# Patient Record
Sex: MALE | Race: BLACK OR AFRICAN AMERICAN | NOT HISPANIC OR LATINO | Employment: FULL TIME | URBAN - METROPOLITAN AREA
[De-identification: names, ages, dates, MRNs, and addresses within clinical notes are randomized per-mention and may not be internally consistent; named-entity substitution may affect disease eponyms.]

---

## 2018-08-14 DIAGNOSIS — F32.9 MAJOR DEPRESSIVE DISORDER: ICD-10-CM

## 2018-08-14 RX ORDER — FLUOXETINE HYDROCHLORIDE 20 MG/1
CAPSULE ORAL
Qty: 90 CAPSULE | Refills: 1 | OUTPATIENT
Start: 2018-08-14

## 2022-11-21 ENCOUNTER — PATIENT MESSAGE (OUTPATIENT)
Dept: OTOLARYNGOLOGY | Facility: CLINIC | Age: 42
End: 2022-11-21
Payer: COMMERCIAL

## 2022-12-01 ENCOUNTER — OFFICE VISIT (OUTPATIENT)
Dept: INTERNAL MEDICINE | Facility: CLINIC | Age: 42
End: 2022-12-01
Payer: COMMERCIAL

## 2022-12-01 ENCOUNTER — TELEPHONE (OUTPATIENT)
Dept: OTOLARYNGOLOGY | Facility: CLINIC | Age: 42
End: 2022-12-01
Payer: COMMERCIAL

## 2022-12-01 VITALS
WEIGHT: 315 LBS | HEART RATE: 84 BPM | DIASTOLIC BLOOD PRESSURE: 82 MMHG | BODY MASS INDEX: 38.36 KG/M2 | SYSTOLIC BLOOD PRESSURE: 138 MMHG | OXYGEN SATURATION: 96 % | HEIGHT: 76 IN

## 2022-12-01 DIAGNOSIS — Z79.899 ON PRE-EXPOSURE PROPHYLAXIS FOR HIV: ICD-10-CM

## 2022-12-01 DIAGNOSIS — Z23 NEED FOR VACCINATION: ICD-10-CM

## 2022-12-01 DIAGNOSIS — Z11.59 ENCOUNTER FOR HEPATITIS C SCREENING TEST FOR LOW RISK PATIENT: ICD-10-CM

## 2022-12-01 DIAGNOSIS — R73.03 PREDIABETES: ICD-10-CM

## 2022-12-01 DIAGNOSIS — G47.33 OSA ON CPAP: ICD-10-CM

## 2022-12-01 DIAGNOSIS — Z00.00 HEALTH MAINTENANCE EXAMINATION: Primary | ICD-10-CM

## 2022-12-01 DIAGNOSIS — J35.1 LARGE TONSILS: ICD-10-CM

## 2022-12-01 DIAGNOSIS — I10 ESSENTIAL HYPERTENSION: ICD-10-CM

## 2022-12-01 DIAGNOSIS — Z20.2 EXPOSURE TO SEXUALLY TRANSMITTED DISEASE (STD): ICD-10-CM

## 2022-12-01 DIAGNOSIS — E55.9 VITAMIN D DEFICIENCY: ICD-10-CM

## 2022-12-01 DIAGNOSIS — Z13.6 SCREENING FOR CARDIOVASCULAR CONDITION: ICD-10-CM

## 2022-12-01 DIAGNOSIS — Z11.4 SCREENING FOR HIV WITHOUT PRESENCE OF RISK FACTORS: ICD-10-CM

## 2022-12-01 PROBLEM — F32.9 MAJOR DEPRESSION: Status: ACTIVE | Noted: 2018-09-11

## 2022-12-01 PROBLEM — E66.01 MORBID OBESITY WITH BMI OF 40.0-44.9, ADULT: Status: ACTIVE | Noted: 2020-08-06

## 2022-12-01 PROCEDURE — 99386 PREV VISIT NEW AGE 40-64: CPT | Mod: 25,S$GLB,, | Performed by: STUDENT IN AN ORGANIZED HEALTH CARE EDUCATION/TRAINING PROGRAM

## 2022-12-01 PROCEDURE — 90472 IMMUNIZATION ADMIN EACH ADD: CPT | Mod: S$GLB,,, | Performed by: STUDENT IN AN ORGANIZED HEALTH CARE EDUCATION/TRAINING PROGRAM

## 2022-12-01 PROCEDURE — 99999 PR PBB SHADOW E&M-EST. PATIENT-LVL IV: ICD-10-PCS | Mod: PBBFAC,,, | Performed by: STUDENT IN AN ORGANIZED HEALTH CARE EDUCATION/TRAINING PROGRAM

## 2022-12-01 PROCEDURE — 99203 PR OFFICE/OUTPT VISIT, NEW, LEVL III, 30-44 MIN: ICD-10-PCS | Mod: 25,S$GLB,, | Performed by: STUDENT IN AN ORGANIZED HEALTH CARE EDUCATION/TRAINING PROGRAM

## 2022-12-01 PROCEDURE — 99203 OFFICE O/P NEW LOW 30 MIN: CPT | Mod: 25,S$GLB,, | Performed by: STUDENT IN AN ORGANIZED HEALTH CARE EDUCATION/TRAINING PROGRAM

## 2022-12-01 PROCEDURE — 90471 FLU VACCINE (QUAD) GREATER THAN OR EQUAL TO 3YO PRESERVATIVE FREE IM: ICD-10-PCS | Mod: S$GLB,,, | Performed by: STUDENT IN AN ORGANIZED HEALTH CARE EDUCATION/TRAINING PROGRAM

## 2022-12-01 PROCEDURE — 90715 TDAP VACCINE GREATER THAN OR EQUAL TO 7YO IM: ICD-10-PCS | Mod: S$GLB,,, | Performed by: STUDENT IN AN ORGANIZED HEALTH CARE EDUCATION/TRAINING PROGRAM

## 2022-12-01 PROCEDURE — 90471 IMMUNIZATION ADMIN: CPT | Mod: S$GLB,,, | Performed by: STUDENT IN AN ORGANIZED HEALTH CARE EDUCATION/TRAINING PROGRAM

## 2022-12-01 PROCEDURE — 99386 PR PREVENTIVE VISIT,NEW,40-64: ICD-10-PCS | Mod: 25,S$GLB,, | Performed by: STUDENT IN AN ORGANIZED HEALTH CARE EDUCATION/TRAINING PROGRAM

## 2022-12-01 PROCEDURE — 90686 FLU VACCINE (QUAD) GREATER THAN OR EQUAL TO 3YO PRESERVATIVE FREE IM: ICD-10-PCS | Mod: S$GLB,,, | Performed by: STUDENT IN AN ORGANIZED HEALTH CARE EDUCATION/TRAINING PROGRAM

## 2022-12-01 PROCEDURE — 99999 PR PBB SHADOW E&M-EST. PATIENT-LVL IV: CPT | Mod: PBBFAC,,, | Performed by: STUDENT IN AN ORGANIZED HEALTH CARE EDUCATION/TRAINING PROGRAM

## 2022-12-01 PROCEDURE — 90715 TDAP VACCINE 7 YRS/> IM: CPT | Mod: S$GLB,,, | Performed by: STUDENT IN AN ORGANIZED HEALTH CARE EDUCATION/TRAINING PROGRAM

## 2022-12-01 PROCEDURE — 90686 IIV4 VACC NO PRSV 0.5 ML IM: CPT | Mod: S$GLB,,, | Performed by: STUDENT IN AN ORGANIZED HEALTH CARE EDUCATION/TRAINING PROGRAM

## 2022-12-01 PROCEDURE — 90472 TDAP VACCINE GREATER THAN OR EQUAL TO 7YO IM: ICD-10-PCS | Mod: S$GLB,,, | Performed by: STUDENT IN AN ORGANIZED HEALTH CARE EDUCATION/TRAINING PROGRAM

## 2022-12-01 RX ORDER — CITALOPRAM 20 MG/1
1 TABLET, FILM COATED ORAL DAILY
COMMUNITY
End: 2023-08-08 | Stop reason: SDUPTHER

## 2022-12-01 RX ORDER — EMTRICITABINE AND TENOFOVIR ALAFENAMIDE 200; 25 MG/1; MG/1
1 TABLET ORAL DAILY
COMMUNITY
End: 2023-08-08

## 2022-12-01 NOTE — PROGRESS NOTES
Subjective:       Patient ID: Baudilio Potts is a 42 y.o. male.    Chief Complaint: Health maintenance examination [Z00.00]    Patient is new to me, here to establish care.    Health maintenance -   Denies family history of colorectal cancer.   Significant family history of cardiac disease.   Denies family history of prostate cancer.  UTD on COVID19 primary/booster vaccinations.  Due for COVID19 bivalent, HPV, Tdap, influenza vaccinations.  Never smoker.  Drinks alcohol 1 time monthly, 1-3 drinks per sitting.  Denies drug use.  Currently sexually active with .  Agreeable to routine STI testing.  Due for HIV and Hep C screening.  Due for lipid screening.  Lab Results       Component                Value               Date                       LDLCALC                  140.8               10/25/2013            Works in [employer]  Not routinely exercising.  Trying to make healthful dietary changes.     Prediabetes -   Due for diabetes screening.  Lab Results       Component                Value               Date                       HGBA1C                   5.3                 10/25/2013              HTN -   Currently prescribed amlodipine.  Patient endorses taking medication as directed.  Denies side effects or concerns while taking medication.  Never previously evaluated for secondary causes of HTN.  Denies headaches, vision changes, CP, palpitations, or other concerning symptoms.  Due for micro albumin creatinine ratio.   BP Readings from Last 3 Encounters:  09/21/15 : (!) 134/98  03/03/15 : (!) 190/120  01/03/14 : (!) 160/90    LALITO -   Using CPAP nightly with good effect  Has been told large tonsils, interested in ENT evaluation    PrEP -  Taking Descovy daily as directed  Was previously taking Truvada, discontinued due to renal concerns   Denies missed doses since last appointment  Completed Hepatitis B vaccination  Due for Hepatitis A vaccination  Due for HPV vaccination  Completed MP vaccination  Renal  "function due to be checked with Cr   STI testing due to be performed, last done OCT2022   Denies current signs or symptoms of STI infection  Denies acute signs or symptoms of HIV infection since last visit      Review of Systems   Constitutional:  Negative for appetite change, chills, fatigue, fever and unexpected weight change.   Respiratory:  Negative for cough and shortness of breath.    Cardiovascular:  Negative for chest pain, palpitations and leg swelling.   Gastrointestinal:  Negative for abdominal pain, constipation, diarrhea, nausea and vomiting.   Genitourinary:  Negative for difficulty urinating and frequency.   Skin:  Negative for rash.   Neurological:  Negative for dizziness, syncope, weakness and headaches.       Current Outpatient Medications   Medication Instructions    amlodipine (NORVASC) 10 MG tablet TAKE 1 TABLET BY MOUTH EVERY DAY    citalopram (CELEXA) 20 MG tablet 1 tablet, Oral, Daily    emtricitabine-tenofovir alafen (DESCOVY) 200-25 mg Tab 1 tablet, Oral, Daily     Objective:      Vitals:    12/01/22 1419   BP: 138/82   Pulse: 84   SpO2: 96%   Weight: (!) 165 kg (363 lb 12.1 oz)   Height: 6' 4" (1.93 m)   PainSc: 0-No pain     Body mass index is 44.28 kg/m².    Physical Exam  Vitals reviewed.   Constitutional:       General: He is not in acute distress.     Appearance: Normal appearance. He is not ill-appearing or diaphoretic.   HENT:      Head: Normocephalic and atraumatic.      Right Ear: Tympanic membrane, ear canal and external ear normal. There is no impacted cerumen.      Left Ear: Tympanic membrane, ear canal and external ear normal. There is no impacted cerumen.      Nose: Nose normal. No rhinorrhea.      Mouth/Throat:      Mouth: Mucous membranes are moist.      Pharynx: Oropharynx is clear. No oropharyngeal exudate or posterior oropharyngeal erythema.      Tonsils: No tonsillar exudate. 3+ on the right. 3+ on the left.   Eyes:      General: No scleral icterus.        Right eye: No " discharge.         Left eye: No discharge.      Conjunctiva/sclera: Conjunctivae normal.   Neck:      Thyroid: No thyromegaly or thyroid tenderness.      Trachea: Trachea normal.   Cardiovascular:      Rate and Rhythm: Normal rate and regular rhythm.      Heart sounds: Normal heart sounds. No murmur heard.    No friction rub. No gallop.   Pulmonary:      Effort: Pulmonary effort is normal. No respiratory distress.      Breath sounds: Normal breath sounds. No stridor. No wheezing, rhonchi or rales.   Abdominal:      General: Bowel sounds are normal. There is no distension.      Palpations: Abdomen is soft.      Tenderness: There is no abdominal tenderness. There is no guarding or rebound.   Musculoskeletal:         General: No swelling or deformity.      Cervical back: Neck supple.   Lymphadenopathy:      Head:      Right side of head: No submandibular or posterior auricular adenopathy.      Left side of head: No submandibular or posterior auricular adenopathy.      Cervical: No cervical adenopathy.      Right cervical: No superficial, deep or posterior cervical adenopathy.     Left cervical: No superficial, deep or posterior cervical adenopathy.      Upper Body:      Right upper body: No supraclavicular adenopathy.      Left upper body: No supraclavicular adenopathy.   Skin:     General: Skin is warm and dry.   Neurological:      General: No focal deficit present.      Mental Status: He is alert. Mental status is at baseline.      Gait: Gait normal.   Psychiatric:         Mood and Affect: Mood normal.         Behavior: Behavior normal.       Assessment:       1. Health maintenance examination    2. Essential hypertension    3. LALITO on CPAP    4. On pre-exposure prophylaxis for HIV    5. Prediabetes    6. Vitamin D deficiency    7. Large tonsils    8. Need for vaccination    9. Screening for cardiovascular condition    10. Encounter for hepatitis C screening test for low risk patient    11. Screening for HIV without  presence of risk factors    12. Exposure to sexually transmitted disease (STD)        Plan:       Essential hypertension  Continue current medications  RTC in 3 months for follow up  -     CBC Auto Differential; Future  -     Comprehensive Metabolic Panel; Future  -     TSH; Future  -     Microalbumin/Creatinine Ratio, Urine; Future    LALITO on CPAP  Large tonsils  -     Ambulatory referral/consult to Sleep Disorders; Future  -     Ambulatory referral/consult to ENT; Future    On pre-exposure prophylaxis for HIV  Continue current medications  RTC in 3 months for follow up  -     HIV 1/2 Ag/Ab (4th Gen); Future  -     Hepatitis C Antibody; Future  -     RPR; Future  -     Hepatitis B Surface Antigen; Future  -     C. trachomatis/N. gonorrhoeae by AMP DNA [facility]; Urine; Future  -     C.trach/N.gonor AMP RNA; Future  -     C.trach/N.gonor AMP RNA; Future    Prediabetes  -     Hemoglobin A1C; Future    Vitamin D deficiency  -     Vitamin D; Future    Health maintenance examination  Need for vaccination  -     (In Office Administered) Tdap Vaccine  -     Influenza - Quadrivalent (PF)    Screening for cardiovascular condition  -     Lipid Panel; Future    Encounter for hepatitis C screening test for low risk patient  -     Hepatitis C Antibody; Future    Screening for HIV without presence of risk factors  -     HIV 1/2 Ag/Ab (4th Gen); Future    Exposure to sexually transmitted disease (STD)  -     RPR; Future  -     Hepatitis B Surface Antigen; Future  -     C. trachomatis/N. gonorrhoeae by AMP DNA [facility]; Urine; Future  -     C.trach/N.gonor AMP RNA; Future  -     C.trach/N.gonor AMP RNA; Future      Tammy Rivera MD  12/1/2022

## 2022-12-01 NOTE — PROGRESS NOTES
After obtaining consent, and per orders of Dr. Rivera , injection of Fluarix Lot #[id number] Expiration 06/30/2023  given by Margarita Brewster., RN  Patient instructed to remain in clinic for 20 minutes afterwards, and to report any adverse reaction to me immediately.    After obtaining consent, and per orders of Dr. Rivera , injection of TdaP Lot # [id number] Expiration 11/29/2024  given by Margarita Brewster, KAYLA . Patient instructed to remain in clinic for 20 minutes afterwards, and to report any adverse reaction to me immediately.

## 2022-12-02 ENCOUNTER — PATIENT MESSAGE (OUTPATIENT)
Dept: INTERNAL MEDICINE | Facility: CLINIC | Age: 42
End: 2022-12-02
Payer: COMMERCIAL

## 2022-12-05 ENCOUNTER — LAB VISIT (OUTPATIENT)
Dept: LAB | Facility: HOSPITAL | Age: 42
End: 2022-12-05
Attending: STUDENT IN AN ORGANIZED HEALTH CARE EDUCATION/TRAINING PROGRAM
Payer: COMMERCIAL

## 2022-12-05 ENCOUNTER — TELEPHONE (OUTPATIENT)
Dept: INTERNAL MEDICINE | Facility: CLINIC | Age: 42
End: 2022-12-05
Payer: COMMERCIAL

## 2022-12-05 DIAGNOSIS — Z79.899 ON PRE-EXPOSURE PROPHYLAXIS FOR HIV: ICD-10-CM

## 2022-12-05 DIAGNOSIS — I10 ESSENTIAL HYPERTENSION: ICD-10-CM

## 2022-12-05 DIAGNOSIS — Z00.00 HEALTH MAINTENANCE EXAMINATION: ICD-10-CM

## 2022-12-05 DIAGNOSIS — Z20.2 EXPOSURE TO SEXUALLY TRANSMITTED DISEASE (STD): ICD-10-CM

## 2022-12-05 LAB
ALBUMIN/CREAT UR: 18.9 UG/MG (ref 0–30)
C TRACH DNA SPEC QL NAA+PROBE: NOT DETECTED
CREAT UR-MCNC: 95 MG/DL (ref 23–375)
MICROALBUMIN UR DL<=1MG/L-MCNC: 18 UG/ML
N GONORRHOEA DNA SPEC QL NAA+PROBE: NOT DETECTED

## 2022-12-05 PROCEDURE — 82570 ASSAY OF URINE CREATININE: CPT | Performed by: STUDENT IN AN ORGANIZED HEALTH CARE EDUCATION/TRAINING PROGRAM

## 2022-12-05 PROCEDURE — 87591 N.GONORRHOEAE DNA AMP PROB: CPT | Performed by: STUDENT IN AN ORGANIZED HEALTH CARE EDUCATION/TRAINING PROGRAM

## 2022-12-05 PROCEDURE — 87491 CHLMYD TRACH DNA AMP PROBE: CPT | Performed by: STUDENT IN AN ORGANIZED HEALTH CARE EDUCATION/TRAINING PROGRAM

## 2022-12-05 PROCEDURE — 82043 UR ALBUMIN QUANTITATIVE: CPT | Performed by: STUDENT IN AN ORGANIZED HEALTH CARE EDUCATION/TRAINING PROGRAM

## 2022-12-05 NOTE — TELEPHONE ENCOUNTER
----- Message from Jessica Barth sent at 12/3/2022  4:24 PM CST -----  Regarding: ENT referral  Contact: [facility]  In basket message sent to [facility] medical staff    Please be advised that the patient's insurance is no longer active.  [facility] cannot process any referral request without active insurance on file.. Portal message sent to the patient to contact [facility] with her/his current insurance information.

## 2022-12-06 ENCOUNTER — PATIENT MESSAGE (OUTPATIENT)
Dept: INTERNAL MEDICINE | Facility: CLINIC | Age: 42
End: 2022-12-06
Payer: COMMERCIAL

## 2022-12-06 ENCOUNTER — PATIENT MESSAGE (OUTPATIENT)
Dept: RESEARCH | Facility: CLINIC | Age: 42
End: 2022-12-06
Payer: COMMERCIAL

## 2022-12-06 ENCOUNTER — TELEPHONE (OUTPATIENT)
Dept: INTERNAL MEDICINE | Facility: CLINIC | Age: 42
End: 2022-12-06
Payer: COMMERCIAL

## 2022-12-06 DIAGNOSIS — D64.9 ANEMIA, UNSPECIFIED TYPE: ICD-10-CM

## 2022-12-06 DIAGNOSIS — A53.9 SYPHILIS: Primary | ICD-10-CM

## 2022-12-06 NOTE — TELEPHONE ENCOUNTER
Please assist with scheduling patient for same day or ASAP nurse appointment for PCN injection. Order for in clinic bicillin injection placed. Please also assist with scheduling follow up labs, thank you!

## 2022-12-07 ENCOUNTER — CLINICAL SUPPORT (OUTPATIENT)
Dept: INTERNAL MEDICINE | Facility: CLINIC | Age: 42
End: 2022-12-07
Payer: COMMERCIAL

## 2022-12-07 ENCOUNTER — LAB VISIT (OUTPATIENT)
Dept: LAB | Facility: OTHER | Age: 42
End: 2022-12-07
Attending: STUDENT IN AN ORGANIZED HEALTH CARE EDUCATION/TRAINING PROGRAM
Payer: COMMERCIAL

## 2022-12-07 DIAGNOSIS — D64.9 ANEMIA, UNSPECIFIED TYPE: ICD-10-CM

## 2022-12-07 LAB
BASOPHILS # BLD AUTO: 0.03 K/UL (ref 0–0.2)
BASOPHILS NFR BLD: 0.7 % (ref 0–1.9)
DIFFERENTIAL METHOD: ABNORMAL
EOSINOPHIL # BLD AUTO: 0.1 K/UL (ref 0–0.5)
EOSINOPHIL NFR BLD: 2.6 % (ref 0–8)
ERYTHROCYTE [DISTWIDTH] IN BLOOD BY AUTOMATED COUNT: 13.2 % (ref 11.5–14.5)
FERRITIN SERPL-MCNC: 215 NG/ML (ref 20–300)
FOLATE SERPL-MCNC: 7.7 NG/ML (ref 4–24)
HCT VFR BLD AUTO: 41.2 % (ref 40–54)
HGB BLD-MCNC: 13.9 G/DL (ref 14–18)
IMM GRANULOCYTES # BLD AUTO: 0.01 K/UL (ref 0–0.04)
IMM GRANULOCYTES NFR BLD AUTO: 0.2 % (ref 0–0.5)
IRON SERPL-MCNC: 103 UG/DL (ref 45–160)
LYMPHOCYTES # BLD AUTO: 1.6 K/UL (ref 1–4.8)
LYMPHOCYTES NFR BLD: 37 % (ref 18–48)
MCH RBC QN AUTO: 31.7 PG (ref 27–31)
MCHC RBC AUTO-ENTMCNC: 33.7 G/DL (ref 32–36)
MCV RBC AUTO: 94 FL (ref 82–98)
MONOCYTES # BLD AUTO: 0.4 K/UL (ref 0.3–1)
MONOCYTES NFR BLD: 9.4 % (ref 4–15)
NEUTROPHILS # BLD AUTO: 2.1 K/UL (ref 1.8–7.7)
NEUTROPHILS NFR BLD: 50.1 % (ref 38–73)
NRBC BLD-RTO: 0 /100 WBC
PATH REV BLD -IMP: NORMAL
PATH REV BLD -IMP: NORMAL
PLATELET # BLD AUTO: 225 K/UL (ref 150–450)
PMV BLD AUTO: 9.8 FL (ref 9.2–12.9)
RBC # BLD AUTO: 4.38 M/UL (ref 4.6–6.2)
RETICS/RBC NFR AUTO: 1.8 % (ref 0.4–2)
SATURATED IRON: 29 % (ref 20–50)
TOTAL IRON BINDING CAPACITY: 361 UG/DL (ref 250–450)
TRANSFERRIN SERPL-MCNC: 244 MG/DL (ref 200–375)
VIT B12 SERPL-MCNC: 474 PG/ML (ref 210–950)
WBC # BLD AUTO: 4.27 K/UL (ref 3.9–12.7)

## 2022-12-07 PROCEDURE — 84466 ASSAY OF TRANSFERRIN: CPT | Performed by: STUDENT IN AN ORGANIZED HEALTH CARE EDUCATION/TRAINING PROGRAM

## 2022-12-07 PROCEDURE — 96372 THER/PROPH/DIAG INJ SC/IM: CPT | Mod: S$GLB,,, | Performed by: STUDENT IN AN ORGANIZED HEALTH CARE EDUCATION/TRAINING PROGRAM

## 2022-12-07 PROCEDURE — 96372 PR INJECTION,THERAP/PROPH/DIAG2ST, IM OR SUBCUT: ICD-10-PCS | Mod: S$GLB,,, | Performed by: STUDENT IN AN ORGANIZED HEALTH CARE EDUCATION/TRAINING PROGRAM

## 2022-12-07 PROCEDURE — 85025 COMPLETE CBC W/AUTO DIFF WBC: CPT | Performed by: STUDENT IN AN ORGANIZED HEALTH CARE EDUCATION/TRAINING PROGRAM

## 2022-12-07 PROCEDURE — 36415 COLL VENOUS BLD VENIPUNCTURE: CPT | Performed by: STUDENT IN AN ORGANIZED HEALTH CARE EDUCATION/TRAINING PROGRAM

## 2022-12-07 PROCEDURE — 82607 VITAMIN B-12: CPT | Performed by: STUDENT IN AN ORGANIZED HEALTH CARE EDUCATION/TRAINING PROGRAM

## 2022-12-07 PROCEDURE — 85060 PATHOLOGIST REVIEW: ICD-10-PCS | Mod: ,,, | Performed by: PATHOLOGY

## 2022-12-07 PROCEDURE — 99999 PR PBB SHADOW E&M-EST. PATIENT-LVL I: CPT | Mod: PBBFAC,,,

## 2022-12-07 PROCEDURE — 99999 PR PBB SHADOW E&M-EST. PATIENT-LVL I: ICD-10-PCS | Mod: PBBFAC,,,

## 2022-12-07 PROCEDURE — 82728 ASSAY OF FERRITIN: CPT | Performed by: STUDENT IN AN ORGANIZED HEALTH CARE EDUCATION/TRAINING PROGRAM

## 2022-12-07 PROCEDURE — 85045 AUTOMATED RETICULOCYTE COUNT: CPT | Performed by: STUDENT IN AN ORGANIZED HEALTH CARE EDUCATION/TRAINING PROGRAM

## 2022-12-07 PROCEDURE — 85060 BLOOD SMEAR INTERPRETATION: CPT | Mod: ,,, | Performed by: PATHOLOGY

## 2022-12-07 PROCEDURE — 82746 ASSAY OF FOLIC ACID SERUM: CPT | Performed by: STUDENT IN AN ORGANIZED HEALTH CARE EDUCATION/TRAINING PROGRAM

## 2022-12-07 NOTE — PROGRESS NOTES
"Here for Bicillin injection 2.4 million units . Allergic to Shellfish  Per order, patient to receive 2.4 million units of Bicillin (penicillin G). The area of injection was palpated using the medial fold and the iliac crest as anatomical landmarks. Patient was advised to relax the muscle. The area was cleaned with alcohol and allowed to dry. Two prefilled syringes containing 1.2 million units of Bicillin (penicillin G) each were used for the following injection procedure. Using a 21g 1.5" needle, 1.2million units of Bicillin (penicillin G) were placed intramuscularly into the left upper outer gluteal quadrant. Using a 21g 1.5" needle, the remaining 1.2 million units of Bicillin (penicillin G) were placed intramuscularly into the right upper outer gluteal quadrant. Patient experienced no complications and was discharged in stable condition. Bicillin (penicillin G) Lot: [id number] Exp: 06/30/2024  Remained in clinic for 20 minutes with no signs of distress      "

## 2022-12-12 ENCOUNTER — PATIENT MESSAGE (OUTPATIENT)
Dept: INTERNAL MEDICINE | Facility: CLINIC | Age: 42
End: 2022-12-12
Payer: COMMERCIAL

## 2022-12-12 DIAGNOSIS — A53.9 SYPHILIS: Primary | ICD-10-CM

## 2022-12-13 NOTE — TELEPHONE ENCOUNTER
Will need to come in for dose 2 of 3 on Wednesday 14DEC, please assist with scheduling nurse visit. Order placed for PCN injection. Thank you!

## 2022-12-14 ENCOUNTER — PATIENT MESSAGE (OUTPATIENT)
Dept: RESEARCH | Facility: CLINIC | Age: 42
End: 2022-12-14
Payer: COMMERCIAL

## 2022-12-14 ENCOUNTER — CLINICAL SUPPORT (OUTPATIENT)
Dept: INTERNAL MEDICINE | Facility: CLINIC | Age: 42
End: 2022-12-14
Payer: COMMERCIAL

## 2022-12-14 PROCEDURE — 96372 PR INJECTION,THERAP/PROPH/DIAG2ST, IM OR SUBCUT: ICD-10-PCS | Mod: S$GLB,,, | Performed by: STUDENT IN AN ORGANIZED HEALTH CARE EDUCATION/TRAINING PROGRAM

## 2022-12-14 PROCEDURE — 96372 THER/PROPH/DIAG INJ SC/IM: CPT | Mod: S$GLB,,, | Performed by: STUDENT IN AN ORGANIZED HEALTH CARE EDUCATION/TRAINING PROGRAM

## 2022-12-14 NOTE — PROGRESS NOTES
"Per order, patient to receive 2.4 million units of Bicillin (penicillin G). The area of injection was palpated using the medial fold and the iliac crest as anatomical landmarks. Patient was advised to relax the muscle. The area was cleaned with alcohol and allowed to dry. Two prefilled syringes containing 1.2 million units of Bicillin (penicillin G) each were used for the following injection procedure. Using a 21g 1.5" needle, 1.2million units of Bicillin (penicillin G) were placed intramuscularly into the left upper outer gluteal quadrant. Using a 21g 1.5" needle, the remaining 1.2 million units of Bicillin (penicillin G) were placed intramuscularly into the right upper outer gluteal quadrant. Patient experienced no complications and was discharged in stable condition. Bicillin (penicillin G) Lot: [id number] Exp:  06/30/2024 . Patient remained in clinic 15-20 minutes following injections. JAHAIRA Brewster RN        "

## 2022-12-20 ENCOUNTER — TELEPHONE (OUTPATIENT)
Dept: INTERNAL MEDICINE | Facility: CLINIC | Age: 42
End: 2022-12-20
Payer: COMMERCIAL

## 2022-12-20 DIAGNOSIS — A53.9 SYPHILIS: Primary | ICD-10-CM

## 2022-12-20 NOTE — TELEPHONE ENCOUNTER
Patient needs to come in for third and final penicillin injection this week. Medication ordered, please assist with scheduling. Thank you!

## 2022-12-21 ENCOUNTER — CLINICAL SUPPORT (OUTPATIENT)
Dept: INTERNAL MEDICINE | Facility: CLINIC | Age: 42
End: 2022-12-21
Payer: COMMERCIAL

## 2022-12-21 PROCEDURE — 96372 THER/PROPH/DIAG INJ SC/IM: CPT | Mod: S$GLB,,, | Performed by: STUDENT IN AN ORGANIZED HEALTH CARE EDUCATION/TRAINING PROGRAM

## 2022-12-21 PROCEDURE — 96372 PR INJECTION,THERAP/PROPH/DIAG2ST, IM OR SUBCUT: ICD-10-PCS | Mod: S$GLB,,, | Performed by: STUDENT IN AN ORGANIZED HEALTH CARE EDUCATION/TRAINING PROGRAM

## 2022-12-21 NOTE — PROGRESS NOTES
"Per order, patient to receive 2.4 million units of Bicillin (penicillin G). The area of injection was palpated using the medial fold and the iliac crest as anatomical landmarks. Patient was advised to relax the muscle. The area was cleaned with alcohol and allowed to dry. Two prefilled syringes containing 1.2 million units of Bicillin (penicillin G) each were used for the following injection procedure. Using a 21g 1.5" needle, 1.2million units of Bicillin (penicillin G) were placed intramuscularly into the left upper outer gluteal quadrant. Using a 21g 1.5" needle, the remaining 1.2 million units of Bicillin (penicillin G) were placed intramuscularly into the right upper outer gluteal quadrant. Patient experienced no complications and was discharged in stable condition. Bicillin (penicillin G) Lot: [id number] Exp: 06/30/2024. Pt tolerated well.      "

## 2022-12-27 ENCOUNTER — PATIENT MESSAGE (OUTPATIENT)
Dept: INTERNAL MEDICINE | Facility: CLINIC | Age: 42
End: 2022-12-27
Payer: COMMERCIAL

## 2023-01-11 ENCOUNTER — PATIENT MESSAGE (OUTPATIENT)
Dept: ADMINISTRATIVE | Facility: HOSPITAL | Age: 43
End: 2023-01-11
Payer: COMMERCIAL

## 2023-01-11 DIAGNOSIS — Z12.11 SCREENING FOR COLON CANCER: ICD-10-CM

## 2023-02-15 ENCOUNTER — PATIENT MESSAGE (OUTPATIENT)
Dept: ADMINISTRATIVE | Facility: HOSPITAL | Age: 43
End: 2023-02-15
Payer: COMMERCIAL

## 2023-02-15 ENCOUNTER — PATIENT OUTREACH (OUTPATIENT)
Dept: ADMINISTRATIVE | Facility: HOSPITAL | Age: 43
End: 2023-02-15
Payer: COMMERCIAL

## 2023-02-16 ENCOUNTER — OFFICE VISIT (OUTPATIENT)
Dept: SLEEP MEDICINE | Facility: CLINIC | Age: 43
End: 2023-02-16
Payer: COMMERCIAL

## 2023-02-16 ENCOUNTER — PATIENT MESSAGE (OUTPATIENT)
Dept: SLEEP MEDICINE | Facility: CLINIC | Age: 43
End: 2023-02-16

## 2023-02-16 VITALS
SYSTOLIC BLOOD PRESSURE: 145 MMHG | WEIGHT: 315 LBS | DIASTOLIC BLOOD PRESSURE: 92 MMHG | BODY MASS INDEX: 38.36 KG/M2 | HEIGHT: 76 IN | HEART RATE: 69 BPM

## 2023-02-16 DIAGNOSIS — G47.10 HYPERSOMNOLENCE: ICD-10-CM

## 2023-02-16 DIAGNOSIS — G47.33 OSA (OBSTRUCTIVE SLEEP APNEA): Primary | ICD-10-CM

## 2023-02-16 DIAGNOSIS — G47.33 OSA ON CPAP: ICD-10-CM

## 2023-02-16 PROCEDURE — 3008F PR BODY MASS INDEX (BMI) DOCUMENTED: ICD-10-PCS | Mod: CPTII,S$GLB,, | Performed by: INTERNAL MEDICINE

## 2023-02-16 PROCEDURE — 1159F PR MEDICATION LIST DOCUMENTED IN MEDICAL RECORD: ICD-10-PCS | Mod: CPTII,S$GLB,, | Performed by: INTERNAL MEDICINE

## 2023-02-16 PROCEDURE — 99203 PR OFFICE/OUTPT VISIT, NEW, LEVL III, 30-44 MIN: ICD-10-PCS | Mod: S$GLB,,, | Performed by: INTERNAL MEDICINE

## 2023-02-16 PROCEDURE — 99999 PR PBB SHADOW E&M-EST. PATIENT-LVL III: ICD-10-PCS | Mod: PBBFAC,,, | Performed by: INTERNAL MEDICINE

## 2023-02-16 PROCEDURE — 1159F MED LIST DOCD IN RCRD: CPT | Mod: CPTII,S$GLB,, | Performed by: INTERNAL MEDICINE

## 2023-02-16 PROCEDURE — 3080F DIAST BP >= 90 MM HG: CPT | Mod: CPTII,S$GLB,, | Performed by: INTERNAL MEDICINE

## 2023-02-16 PROCEDURE — 3077F PR MOST RECENT SYSTOLIC BLOOD PRESSURE >= 140 MM HG: ICD-10-PCS | Mod: CPTII,S$GLB,, | Performed by: INTERNAL MEDICINE

## 2023-02-16 PROCEDURE — 3008F BODY MASS INDEX DOCD: CPT | Mod: CPTII,S$GLB,, | Performed by: INTERNAL MEDICINE

## 2023-02-16 PROCEDURE — 3080F PR MOST RECENT DIASTOLIC BLOOD PRESSURE >= 90 MM HG: ICD-10-PCS | Mod: CPTII,S$GLB,, | Performed by: INTERNAL MEDICINE

## 2023-02-16 PROCEDURE — 3077F SYST BP >= 140 MM HG: CPT | Mod: CPTII,S$GLB,, | Performed by: INTERNAL MEDICINE

## 2023-02-16 PROCEDURE — 99999 PR PBB SHADOW E&M-EST. PATIENT-LVL III: CPT | Mod: PBBFAC,,, | Performed by: INTERNAL MEDICINE

## 2023-02-16 PROCEDURE — 99203 OFFICE O/P NEW LOW 30 MIN: CPT | Mod: S$GLB,,, | Performed by: INTERNAL MEDICINE

## 2023-02-16 NOTE — PROGRESS NOTES
Referred by Tammy Rivera MD     NEW PATIENT VISIT    Baudilio Potts  is a pleasant 43 y.o. male  with PMH significant for HTN, BMI 40+, LALITO dx many years ago who presents for management of LALITO.    Using CPAP nightly  Wants to establish care    PAP history   Problems    Mask FFM   Pressure    DME Online, would like to establish with HME   Machine age Circa 2017, resmed   Download 5.11.22:29/30 x 3u70pod, BiPAP22/16, Leak 5/24/41, AHI 10.6 (o9.0)         Sleep Clinic New Patient 2/16/2023   What time do you go to bed on a week day? (Give a range) Between 10pm and 11pm   What time do you go to bed on a day off? (Give a range) Between 11pm and midnight   How long does it take you to fall asleep? (Give a range) 30-40 minutes   On average, how many times per night do you wake up? 2-3   How long does it take you to fall back into sleep? (Give a range) 10-20 minutes   What time do you wake up to start your day on a week day? (Give a range) 4am-5:30am   What time do you wake up to start your day on a day off? (Give a range) 6-7 am   What time do you get out of bed? (Give a range) 6:30   On average, how many hours do you sleep? 4-6   On average, how many naps do you take per day? 1   Rate your sleep quality from 0 to 5 (0-poor, 5-great). 3   Have you experienced:  Weight loss?   How much weight have you lost or gained (in lbs.) in the last year? 15   On average, how many times per night do you go to the bathroom?  2   Have you ever had a sleep study/CPAP machine/surgery for sleep apnea? Yes   Have you ever had a CPAP machine for sleep apnea? Yes   Have you ever had surgery for sleep apnea? No     Sleep Clinic ROS  2/16/2023   Difficulty breathing through the nose?  Sometimes   Sore throat or dry mouth in the morning? Sometimes   Irregular or very fast heart beat?  No   Shortness of breath?  No   Acid reflux? No   Body aches and pains?  Sometimes   Morning headaches? No   Dizziness? No   Mood changes?  Sometimes  "  Do you exercise?  No   Do you feel like moving your legs a lot?  Yes       Past Medical History:   Diagnosis Date    Hypertension      Patient Active Problem List   Diagnosis    Hypertension    Obesity    LALITO on CPAP    Major depression    Morbid obesity with BMI of 40.0-44.9, adult    Essential hypertension    Prediabetes    On pre-exposure prophylaxis for HIV       Current Outpatient Medications:     amlodipine (NORVASC) 10 MG tablet, TAKE 1 TABLET BY MOUTH EVERY DAY, Disp: 90 tablet, Rfl: 1    citalopram (CELEXA) 20 MG tablet, Take 1 tablet by mouth once daily., Disp: , Rfl:     emtricitabine-tenofovir alafen (DESCOVY) 200-25 mg Tab, Take 1 tablet by mouth once daily., Disp: , Rfl:        Vitals:    02/16/23 0911   BP: (!) 145/92   BP Location: Left arm   Patient Position: Sitting   BP Method: Medium (Automatic)   Pulse: 69   Weight: (!) 160.6 kg (354 lb 0.9 oz)   Height: 6' 4" (1.93 m)     Physical Exam:    GEN:   Well-appearing  Psych:  Appropriate affect, demonstrates insight  SKIN:  No rash on the face or bridge of the nose      LABS:   Lab Results   Component Value Date    HGB 13.9 (L) 12/07/2022    CO2 26 12/05/2022       RECORDS REVIEWED PREVIOUSLY:    Prior sleep studies are unavailable. (BR in 2017)    ASSESSMENT    PROBLEM DESCRIPTION/ Sx on Presentation  STATUS   HX LALITO   Dx 2017    New   Daytime Sx   some sleepiness when inactive   ESS 12/24 on intake  New   Insomnia   Trouble falling asleep:   Maintenance:         x 2, mask problems  Prior hypnotics:        Current hypnotics:     New   Nocturia   x 2 per sleep period  New   Other issues:     PLAN     -will try to get outside studies ([place] [facility])  -will need repeat bipap titration due to AHI of 10 on bipap 22/16  -the patient is using and benefiting from PAP therapy    RTC          The patient was given open opportunity to ask questions and/or express concerns about treatment plan.   All questions/concerns were discussed.     Two " patient identifiers used prior to evaluation.

## 2023-02-24 ENCOUNTER — TELEPHONE (OUTPATIENT)
Dept: SLEEP MEDICINE | Facility: CLINIC | Age: 43
End: 2023-02-24
Payer: COMMERCIAL

## 2023-05-16 ENCOUNTER — OFFICE VISIT (OUTPATIENT)
Dept: SLEEP MEDICINE | Facility: CLINIC | Age: 43
End: 2023-05-16
Payer: COMMERCIAL

## 2023-05-16 DIAGNOSIS — G47.10 HYPERSOMNOLENCE: ICD-10-CM

## 2023-05-16 DIAGNOSIS — G47.33 OSA (OBSTRUCTIVE SLEEP APNEA): Primary | ICD-10-CM

## 2023-05-16 PROCEDURE — 99214 PR OFFICE/OUTPT VISIT, EST, LEVL IV, 30-39 MIN: ICD-10-PCS | Mod: 95,,, | Performed by: INTERNAL MEDICINE

## 2023-05-16 PROCEDURE — 99214 OFFICE O/P EST MOD 30 MIN: CPT | Mod: 95,,, | Performed by: INTERNAL MEDICINE

## 2023-05-16 NOTE — PROGRESS NOTES
The patient location is: [place]  The chief complaint leading to consultation is: LALITO on CPAP    Visit type: audiovisual    15 minutes of total time spent on the encounter, which includes face to face time and non-face to face time preparing to see the patient (eg, review of tests), Obtaining and/or reviewing separately obtained history, Documenting clinical information in the electronic or other health record, Independently interpreting results (not separately reported) and communicating results to the patient/family/caregiver, or Care coordination (not separately reported).     Each patient to whom he or she provides medical services by telemedicine is:  (1) informed of the relationship between the physician and patient and the respective role of any other health care provider with respect to management of the patient; and (2) notified that he or she may decline to receive medical services by telemedicine and may withdraw from such care at any time.    ESTABLISHED PATIENT VISIT    Baudilio Potts  is a pleasant 43 y.o. male  with PMH significant for HTN, BMI 40+, LALITO dx many years ago who presented early 2023 for management of LALITO.    Here today for follow-up    PLAN last visit:     -will try to get outside studies ([place] [facility])  -will need repeat bipap titration due to AHI of 10 on bipap 22/16      Since last visit:   His machine has started cutting off during the night  Otherwise using nightly      PAP history   Problems    Mask FFM   Pressure    DME Online, would like to establish with HME   Machine age Circa 2017, resmed   Download pending         Sleep Clinic New Patient 2/16/2023   What time do you go to bed on a week day? (Give a range) Between 10pm and 11pm   What time do you go to bed on a day off? (Give a range) Between 11pm and midnight   How long does it take you to fall asleep? (Give a range) 30-40 minutes   On average, how many times per night do you wake up? 2-3   How long does it  take you to fall back into sleep? (Give a range) 10-20 minutes   What time do you wake up to start your day on a week day? (Give a range) 4am-5:30am   What time do you wake up to start your day on a day off? (Give a range) 6-7 am   What time do you get out of bed? (Give a range) 6:30   On average, how many hours do you sleep? 4-6   On average, how many naps do you take per day? 1   Rate your sleep quality from 0 to 5 (0-poor, 5-great). 3   Have you experienced:  Weight loss?   How much weight have you lost or gained (in lbs.) in the last year? 15   On average, how many times per night do you go to the bathroom?  2   Have you ever had a sleep study/CPAP machine/surgery for sleep apnea? Yes   Have you ever had a CPAP machine for sleep apnea? Yes   Have you ever had surgery for sleep apnea? No     Sleep Clinic ROS  2/16/2023   Difficulty breathing through the nose?  Sometimes   Sore throat or dry mouth in the morning? Sometimes   Irregular or very fast heart beat?  No   Shortness of breath?  No   Acid reflux? No   Body aches and pains?  Sometimes   Morning headaches? No   Dizziness? No   Mood changes?  Sometimes   Do you exercise?  No   Do you feel like moving your legs a lot?  Yes       Past Medical History:   Diagnosis Date    Hypertension      Patient Active Problem List   Diagnosis    Hypertension    Obesity    LALITO on CPAP    Major depression    Morbid obesity with BMI of 40.0-44.9, adult    Essential hypertension    Prediabetes    On pre-exposure prophylaxis for HIV       Current Outpatient Medications:     amlodipine (NORVASC) 10 MG tablet, TAKE 1 TABLET BY MOUTH EVERY DAY, Disp: 90 tablet, Rfl: 1    citalopram (CELEXA) 20 MG tablet, Take 1 tablet by mouth once daily., Disp: , Rfl:     emtricitabine-tenofovir alafen (DESCOVY) 200-25 mg Tab, Take 1 tablet by mouth once daily., Disp: , Rfl:        There were no vitals filed for this visit.    Physical Exam:    GEN:   Well-appearing  Psych:  Appropriate affect,  demonstrates insight  SKIN:  No rash on the face or bridge of the nose      LABS:   Lab Results   Component Value Date    HGB 13.9 (L) 12/07/2022    CO2 26 12/05/2022       RECORDS REVIEWED PREVIOUSLY:    Prior sleep studies are unavailable. (BR in 2017)    Download  5.11.22:29/30 x [id number], BiPAP22/16, Leak 5/24/41, AHI 10.6 (o9.0)    ASSESSMENT    PROBLEM DESCRIPTION/ Sx on Presentation Interval Hx STATUS   HX LALITO   Dx 2017   Reports decent usage  No download avaiable Partially controlled   Daytime Sx   some sleepiness when inactive   ESS 12/24 on intake Still some sleepiness in the afternoon at times persists   Insomnia   Trouble falling asleep:   Maintenance:         x 2, mask problems  Prior hypnotics:        Current hypnotics:    Still waking at least twice a night with his machine uncontrolled   Nocturia   x 2 per sleep period X 2 per night persists   Other issues:     PLAN     -the patient's machine is nearing the end of its usable life, needs a new one  -he will send a copy of his prior sleep study, once received will order auto BiPAP, EPAP min=16, IPAP max =25, PS 4  -consider titration if AHI remains elevated  -the patient is using and benefiting from PAP therapy    RTC 1-2 months after getting new machine         The patient was given open opportunity to ask questions and/or express concerns about treatment plan.   All questions/concerns were discussed.     Two patient identifiers used prior to evaluation.

## 2023-05-28 ENCOUNTER — PATIENT MESSAGE (OUTPATIENT)
Dept: INTERNAL MEDICINE | Facility: CLINIC | Age: 43
End: 2023-05-28
Payer: COMMERCIAL

## 2023-05-28 DIAGNOSIS — R73.03 PREDIABETES: Primary | ICD-10-CM

## 2023-06-02 ENCOUNTER — PATIENT MESSAGE (OUTPATIENT)
Dept: ADMINISTRATIVE | Facility: HOSPITAL | Age: 43
End: 2023-06-02
Payer: COMMERCIAL

## 2023-07-06 ENCOUNTER — PATIENT MESSAGE (OUTPATIENT)
Dept: SLEEP MEDICINE | Facility: CLINIC | Age: 43
End: 2023-07-06
Payer: COMMERCIAL

## 2023-07-06 DIAGNOSIS — G47.33 OSA (OBSTRUCTIVE SLEEP APNEA): Primary | ICD-10-CM

## 2023-07-06 DIAGNOSIS — G47.10 HYPERSOMNOLENCE: ICD-10-CM

## 2023-07-13 ENCOUNTER — TELEPHONE (OUTPATIENT)
Dept: SLEEP MEDICINE | Facility: OTHER | Age: 43
End: 2023-07-13
Payer: COMMERCIAL

## 2023-07-14 ENCOUNTER — HOSPITAL ENCOUNTER (OUTPATIENT)
Dept: SLEEP MEDICINE | Facility: OTHER | Age: 43
Discharge: HOME | End: 2023-07-14
Attending: INTERNAL MEDICINE
Payer: COMMERCIAL

## 2023-07-14 DIAGNOSIS — G47.33 OSA (OBSTRUCTIVE SLEEP APNEA): ICD-10-CM

## 2023-07-14 DIAGNOSIS — G47.10 HYPERSOMNOLENCE: ICD-10-CM

## 2023-07-14 PROCEDURE — 95800 SLP STDY UNATTENDED: CPT

## 2023-07-19 PROCEDURE — 95806 SLEEP STUDY UNATT&RESP EFFT: CPT | Mod: 26,52,, | Performed by: INTERNAL MEDICINE

## 2023-07-19 PROCEDURE — 95806 PR SLEEP STUDY, UNATTENDED, SIMUL RECORD HR/O2 SAT/RESP FLOW/RESP EFFT: ICD-10-PCS | Mod: 26,52,, | Performed by: INTERNAL MEDICINE

## 2023-07-20 ENCOUNTER — PATIENT MESSAGE (OUTPATIENT)
Dept: SLEEP MEDICINE | Facility: CLINIC | Age: 43
End: 2023-07-20
Payer: COMMERCIAL

## 2023-07-20 DIAGNOSIS — G47.33 OSA (OBSTRUCTIVE SLEEP APNEA): Primary | ICD-10-CM

## 2023-08-08 ENCOUNTER — OFFICE VISIT (OUTPATIENT)
Dept: INTERNAL MEDICINE | Facility: CLINIC | Age: 43
End: 2023-08-08
Attending: FAMILY MEDICINE
Payer: COMMERCIAL

## 2023-08-08 ENCOUNTER — TELEPHONE (OUTPATIENT)
Dept: INTERNAL MEDICINE | Facility: CLINIC | Age: 43
End: 2023-08-08
Payer: COMMERCIAL

## 2023-08-08 VITALS
WEIGHT: 315 LBS | DIASTOLIC BLOOD PRESSURE: 98 MMHG | SYSTOLIC BLOOD PRESSURE: 136 MMHG | OXYGEN SATURATION: 94 % | BODY MASS INDEX: 38.36 KG/M2 | HEART RATE: 79 BPM | HEIGHT: 76 IN

## 2023-08-08 DIAGNOSIS — I10 ESSENTIAL HYPERTENSION: Primary | ICD-10-CM

## 2023-08-08 DIAGNOSIS — Z01.00 ROUTINE EYE EXAM: ICD-10-CM

## 2023-08-08 DIAGNOSIS — E66.01 CLASS 3 SEVERE OBESITY DUE TO EXCESS CALORIES WITH SERIOUS COMORBIDITY AND BODY MASS INDEX (BMI) OF 45.0 TO 49.9 IN ADULT: ICD-10-CM

## 2023-08-08 PROCEDURE — 1160F PR REVIEW ALL MEDS BY PRESCRIBER/CLIN PHARMACIST DOCUMENTED: ICD-10-PCS | Mod: CPTII,S$GLB,, | Performed by: FAMILY MEDICINE

## 2023-08-08 PROCEDURE — 3075F PR MOST RECENT SYSTOLIC BLOOD PRESS GE 130-139MM HG: ICD-10-PCS | Mod: CPTII,S$GLB,, | Performed by: FAMILY MEDICINE

## 2023-08-08 PROCEDURE — 1159F PR MEDICATION LIST DOCUMENTED IN MEDICAL RECORD: ICD-10-PCS | Mod: CPTII,S$GLB,, | Performed by: FAMILY MEDICINE

## 2023-08-08 PROCEDURE — 99214 PR OFFICE/OUTPT VISIT, EST, LEVL IV, 30-39 MIN: ICD-10-PCS | Mod: S$GLB,,, | Performed by: FAMILY MEDICINE

## 2023-08-08 PROCEDURE — 3080F DIAST BP >= 90 MM HG: CPT | Mod: CPTII,S$GLB,, | Performed by: FAMILY MEDICINE

## 2023-08-08 PROCEDURE — 99999 PR PBB SHADOW E&M-EST. PATIENT-LVL IV: CPT | Mod: PBBFAC,,, | Performed by: FAMILY MEDICINE

## 2023-08-08 PROCEDURE — 3008F BODY MASS INDEX DOCD: CPT | Mod: CPTII,S$GLB,, | Performed by: FAMILY MEDICINE

## 2023-08-08 PROCEDURE — 99999 PR PBB SHADOW E&M-EST. PATIENT-LVL IV: ICD-10-PCS | Mod: PBBFAC,,, | Performed by: FAMILY MEDICINE

## 2023-08-08 PROCEDURE — 99214 OFFICE O/P EST MOD 30 MIN: CPT | Mod: S$GLB,,, | Performed by: FAMILY MEDICINE

## 2023-08-08 PROCEDURE — 1159F MED LIST DOCD IN RCRD: CPT | Mod: CPTII,S$GLB,, | Performed by: FAMILY MEDICINE

## 2023-08-08 PROCEDURE — 3080F PR MOST RECENT DIASTOLIC BLOOD PRESSURE >= 90 MM HG: ICD-10-PCS | Mod: CPTII,S$GLB,, | Performed by: FAMILY MEDICINE

## 2023-08-08 PROCEDURE — 3008F PR BODY MASS INDEX (BMI) DOCUMENTED: ICD-10-PCS | Mod: CPTII,S$GLB,, | Performed by: FAMILY MEDICINE

## 2023-08-08 PROCEDURE — 1160F RVW MEDS BY RX/DR IN RCRD: CPT | Mod: CPTII,S$GLB,, | Performed by: FAMILY MEDICINE

## 2023-08-08 PROCEDURE — 3075F SYST BP GE 130 - 139MM HG: CPT | Mod: CPTII,S$GLB,, | Performed by: FAMILY MEDICINE

## 2023-08-08 RX ORDER — EMTRICITABINE AND TENOFOVIR DISOPROXIL FUMARATE 200; 300 MG/1; MG/1
1 TABLET, FILM COATED ORAL DAILY
COMMUNITY
Start: 2023-07-28

## 2023-08-08 RX ORDER — SEMAGLUTIDE 0.68 MG/ML
0.5 INJECTION, SOLUTION SUBCUTANEOUS
Qty: 3 ML | Refills: 0 | Status: CANCELLED | OUTPATIENT
Start: 2023-08-08

## 2023-08-08 RX ORDER — CITALOPRAM 20 MG/1
20 TABLET, FILM COATED ORAL DAILY
Qty: 90 TABLET | Refills: 3 | Status: SHIPPED | OUTPATIENT
Start: 2023-08-08

## 2023-08-08 NOTE — PROGRESS NOTES
"CHIEF COMPLAINT:  Weight and intermittent vertigo    HISTORY OF PRESENT ILLNESS: The patient is a generally healthy 43 year-old.  The patient has a couple of mild complaints today.      He is well aware of his BMI and is actively working to reduce it.  He is interested in semaglutide as discuss with his PCP previously    The patient has a history of stable hypertension on current medications.  Patient denies chest pain or shortness of breath today.    REVIEW OF SYSTEMS:  GENERAL: No fever, chills, fatigability or weight loss.  SKIN: No rashes, itching or changes in color or texture of skin.  HEAD: No headaches or recent head trauma.  EYES: Visual acuity fine. No photophobia, ocular pain or diplopia.  EARS: Denies ear pain, discharge or vertigo.  NOSE: No loss of smell, no epistaxis or postnasal drip.  MOUTH & THROAT: No hoarseness or change in voice. No excessive gum bleeding.  NODES: Denies swollen glands.  CHEST: Denies HENRY, cyanosis, wheezing, cough and sputum production.  CARDIOVASCULAR: Denies chest pain, PND, orthopnea or reduced exercise tolerance.  ABDOMEN: Appetite fine. No weight loss. Denies diarrhea, abdominal pain, hematemesis or blood in stool.  URINARY: No flank pain, dysuria or hematuria.  PERIPHERAL VASCULAR: No claudication or cyanosis.  MUSCULOSKELETAL: No joint stiffness or swelling. Denies back pain.  NEUROLOGIC: No history of seizures, paralysis, alteration of gait or coordination.    SOCIAL HISTORY: The patient does not smoke.  The patient consumes alcohol socially.  The patient is single.    PHYSICAL EXAMINATION:   Blood pressure (!) 136/98, pulse 79, height 6' 4" (1.93 m), weight (!) 169.2 kg (372 lb 14.5 oz), SpO2 (!) 94 %.    APPEARANCE: Well nourished, well developed, in no acute distress.    HEAD: Normocephalic, atraumatic.  EYES: PERRL. EOMI.  Conjunctivae without injection and  anicteric  NOSE: Mucosa pink. Airway clear.  MOUTH & THROAT: No tonsillar enlargement. No pharyngeal erythema " or exudate. No stridor.  NECK: Supple.   NODES: No cervical, axillary or inguinal lymph node enlargement.  CHEST: Lungs clear to auscultation.  No retractions are noted.  No rales or rhonchi are present.  CARDIOVASCULAR: Normal S1, S2. No rubs, murmurs or gallops.  ABDOMEN: Bowel sounds normal. Not distended. Soft. No tenderness or masses.  No ascites is noted.  MUSCULOSKELETAL:  There is no clubbing, cyanosis, or edema of the extremities x4.  There is full range of motion of the lumbar spine.  There is full range of motion of the extremities x4.  There is no deformity noted.    NEUROLOGIC:       Normal speech development.      Hearing normal.      Normal gait.      Motor and sensory exams grossly normal.  PSYCHIATRIC: Patient is alert and oriented x3.  Thought processes are all normal.  There is no homicidality.  There is no suicidality.  There is no evidence of psychosis.    LABORATORY/RADIOLOGY:   Chart reviewed.      ASSESSMENT:   BMI 45  Vision changes  Depression  Hypertension    PLAN:  Follow up PCP for annual in a couple months.  Defer ozempic to Dr GASTELUM  Ophthalmology referral  Refill Celexa    Answers submitted by the patient for this visit:  Review of Systems Questionnaire (Submitted on 7/24/2023)  activity change: No  unexpected weight change: No  neck pain: No  hearing loss: No  rhinorrhea: No  trouble swallowing: No  eye discharge: No  visual disturbance: No  chest tightness: No  wheezing: No  chest pain: No  palpitations: No  blood in stool: No  constipation: No  vomiting: No  diarrhea: No  polydipsia: No  polyuria: No  difficulty urinating: No  urgency: No  hematuria: No  joint swelling: No  arthralgias: No  headaches: No  weakness: No  confusion: No  dysphoric mood: Yes

## 2023-09-15 ENCOUNTER — PATIENT MESSAGE (OUTPATIENT)
Dept: INTERNAL MEDICINE | Facility: CLINIC | Age: 43
End: 2023-09-15
Payer: COMMERCIAL

## 2023-09-22 VITALS — DIASTOLIC BLOOD PRESSURE: 84 MMHG | SYSTOLIC BLOOD PRESSURE: 130 MMHG

## 2023-09-26 ENCOUNTER — TELEPHONE (OUTPATIENT)
Dept: SLEEP MEDICINE | Facility: CLINIC | Age: 43
End: 2023-09-26
Payer: COMMERCIAL

## 2023-10-03 ENCOUNTER — CLINICAL SUPPORT (OUTPATIENT)
Dept: OTHER | Facility: CLINIC | Age: 43
End: 2023-10-03
Payer: COMMERCIAL

## 2023-10-03 DIAGNOSIS — Z00.8 ENCOUNTER FOR OTHER GENERAL EXAMINATION: ICD-10-CM

## 2023-10-03 PROCEDURE — 99401 PR PREVENT COUNSEL,INDIV,15 MIN: ICD-10-PCS | Mod: S$GLB,,, | Performed by: INTERNAL MEDICINE

## 2023-10-03 PROCEDURE — 82947 ASSAY GLUCOSE BLOOD QUANT: CPT | Mod: QW,S$GLB,, | Performed by: INTERNAL MEDICINE

## 2023-10-03 PROCEDURE — 99401 PREV MED CNSL INDIV APPRX 15: CPT | Mod: S$GLB,,, | Performed by: INTERNAL MEDICINE

## 2023-10-03 PROCEDURE — 80061 LIPID PANEL: CPT | Mod: QW,S$GLB,, | Performed by: INTERNAL MEDICINE

## 2023-10-03 PROCEDURE — 82947 PR  ASSAY QUANTITATIVE,BLOOD GLUCOSE: ICD-10-PCS | Mod: QW,S$GLB,, | Performed by: INTERNAL MEDICINE

## 2023-10-03 PROCEDURE — 80061 PR  LIPID PANEL: ICD-10-PCS | Mod: QW,S$GLB,, | Performed by: INTERNAL MEDICINE

## 2023-10-04 ENCOUNTER — OFFICE VISIT (OUTPATIENT)
Dept: SLEEP MEDICINE | Facility: CLINIC | Age: 43
End: 2023-10-04
Payer: COMMERCIAL

## 2023-10-04 VITALS
HEIGHT: 74 IN | WEIGHT: 315 LBS | DIASTOLIC BLOOD PRESSURE: 89 MMHG | BODY MASS INDEX: 40.43 KG/M2 | SYSTOLIC BLOOD PRESSURE: 137 MMHG | HEART RATE: 75 BPM

## 2023-10-04 VITALS
WEIGHT: 315 LBS | HEIGHT: 75 IN | DIASTOLIC BLOOD PRESSURE: 89 MMHG | BODY MASS INDEX: 39.17 KG/M2 | SYSTOLIC BLOOD PRESSURE: 143 MMHG

## 2023-10-04 DIAGNOSIS — G47.33 OSA (OBSTRUCTIVE SLEEP APNEA): Primary | ICD-10-CM

## 2023-10-04 LAB
GLUCOSE SERPL-MCNC: 112 MG/DL (ref 60–140)
HDLC SERPL-MCNC: 24 MG/DL
POC CHOLESTEROL, LDL (DOCK): 82 MG/DL
POC CHOLESTEROL, TOTAL: 123 MG/DL
TRIGL SERPL-MCNC: 88 MG/DL

## 2023-10-04 PROCEDURE — 3075F PR MOST RECENT SYSTOLIC BLOOD PRESS GE 130-139MM HG: ICD-10-PCS | Mod: CPTII,S$GLB,, | Performed by: PHYSICIAN ASSISTANT

## 2023-10-04 PROCEDURE — 3079F PR MOST RECENT DIASTOLIC BLOOD PRESSURE 80-89 MM HG: ICD-10-PCS | Mod: CPTII,S$GLB,, | Performed by: PHYSICIAN ASSISTANT

## 2023-10-04 PROCEDURE — 3008F PR BODY MASS INDEX (BMI) DOCUMENTED: ICD-10-PCS | Mod: CPTII,S$GLB,, | Performed by: PHYSICIAN ASSISTANT

## 2023-10-04 PROCEDURE — 99214 OFFICE O/P EST MOD 30 MIN: CPT | Mod: S$GLB,,, | Performed by: PHYSICIAN ASSISTANT

## 2023-10-04 PROCEDURE — 3079F DIAST BP 80-89 MM HG: CPT | Mod: CPTII,S$GLB,, | Performed by: PHYSICIAN ASSISTANT

## 2023-10-04 PROCEDURE — 99999 PR PBB SHADOW E&M-EST. PATIENT-LVL III: ICD-10-PCS | Mod: PBBFAC,,, | Performed by: PHYSICIAN ASSISTANT

## 2023-10-04 PROCEDURE — 1160F RVW MEDS BY RX/DR IN RCRD: CPT | Mod: CPTII,S$GLB,, | Performed by: PHYSICIAN ASSISTANT

## 2023-10-04 PROCEDURE — 1159F PR MEDICATION LIST DOCUMENTED IN MEDICAL RECORD: ICD-10-PCS | Mod: CPTII,S$GLB,, | Performed by: PHYSICIAN ASSISTANT

## 2023-10-04 PROCEDURE — 3008F BODY MASS INDEX DOCD: CPT | Mod: CPTII,S$GLB,, | Performed by: PHYSICIAN ASSISTANT

## 2023-10-04 PROCEDURE — 3075F SYST BP GE 130 - 139MM HG: CPT | Mod: CPTII,S$GLB,, | Performed by: PHYSICIAN ASSISTANT

## 2023-10-04 PROCEDURE — 99999 PR PBB SHADOW E&M-EST. PATIENT-LVL III: CPT | Mod: PBBFAC,,, | Performed by: PHYSICIAN ASSISTANT

## 2023-10-04 PROCEDURE — 1159F MED LIST DOCD IN RCRD: CPT | Mod: CPTII,S$GLB,, | Performed by: PHYSICIAN ASSISTANT

## 2023-10-04 PROCEDURE — 99214 PR OFFICE/OUTPT VISIT, EST, LEVL IV, 30-39 MIN: ICD-10-PCS | Mod: S$GLB,,, | Performed by: PHYSICIAN ASSISTANT

## 2023-10-04 PROCEDURE — 1160F PR REVIEW ALL MEDS BY PRESCRIBER/CLIN PHARMACIST DOCUMENTED: ICD-10-PCS | Mod: CPTII,S$GLB,, | Performed by: PHYSICIAN ASSISTANT

## 2023-10-04 NOTE — PROGRESS NOTES
Referred by No ref. provider found     ESTABLISHED PATIENT VISIT  New to me. Follows with Dr Amari Zhangmarnie Potts  is a pleasant 43 y.o. male  with PMH significant for  HTN, preDM, depression, BMI 39+, LALITO (dx 2017 in [place])       Here today for: BiPAP follow-up     PLAN last visit 5/16/23:   -the patient's machine is nearing the end of its usable life, needs a new one  -he will send a copy of his prior sleep study, once received will order auto BiPAP, EPAP min=16, IPAP max =25, PS 4  -consider titration if AHI remains elevated  -the patient is using and benefiting from PAP therapy      Since last visit:   Unable to obtain sleep study from [place] at initial dx in 2017.   HST completed 7/14/23: AHI 50, RDI 86. BiPAP ordered.  Has been using nightly with good control in symptoms.   Tolerating current pressures well.   Nasal mask still leaking.      PAP history   Problems    Mask Nasal mask   Pressure 25/16, PS 4   DME HME   Machine age AirCurve 10 VAuto  8/21/23   Download 10/4/23: 30/30 x 5hrs 25mins, 25/16 PS 4, leak (8/53/70.7), AHI 3.7         SLEEP SCHEDULE   Environment    Bed Time 10-11PM   Sleep Latency 1hr   Arousals 2   Nocturia 1   Back to sleep 20mins   Wake time 5:30AM work day, 7AM off day   Naps one   Work          Past Medical History:   Diagnosis Date    Hypertension      Patient Active Problem List   Diagnosis    Hypertension    Obesity    LALITO (obstructive sleep apnea)    Major depression    Morbid obesity with BMI of 40.0-44.9, adult    Essential hypertension    Prediabetes    On pre-exposure prophylaxis for HIV       Current Outpatient Medications:     amlodipine (NORVASC) 10 MG tablet, TAKE 1 TABLET BY MOUTH EVERY DAY, Disp: 90 tablet, Rfl: 1    citalopram (CELEXA) 20 MG tablet, Take 1 tablet (20 mg total) by mouth once daily., Disp: 90 tablet, Rfl: 3    emtricitabine-tenofovir 200-300 mg (TRUVADA) 200-300 mg Tab, Take 1 tablet by mouth once daily., Disp: , Rfl:      There were no vitals filed for  "this visit.    Physical Exam:    GEN:   Well-appearing  Psych:  Appropriate affect, demonstrates insight  SKIN:  No rash on the face or bridge of the nose      LABS:   No results found for: "HGB", "CO2"    RECORDS REVIEWED PREVIOUSLY:    HST 23: AHI 50, RDI 86    ASSESSMENT    Hammond Sleepiness Scale:  Sitting and readin  Watching TV:   1  Passenger in a car x 1 hr:  1  Sitting quietly after lunch:  2  Lying down to rest in PM:  3  Sitting, inactive in public:  1  Sitting+ talking to someone:  0  Stopped in traffic:   0  Total       PROBLEM DESCRIPTION/ Sx on Presentation Interval Hx STATUS    LALITO   Dx 2017 in [place] (unable to obtain study)  HST  AHI 50, RDI 86  Using BiPAP nightly   Good usage and efficiency  controlled   Daytime Sx   + sleepiness when inactive   ESS  on intake (reviewed from 9/21/15) Sleepiness improved, wakes feeling more refreshed and less groggy New   Insomnia   Trouble falling asleep:   Maintenance:         x 2, mask problems  Prior hypnotics:        Current hypnotics:    Wakes occasionally, but less frequently with new PAP New   Nocturia   x 2 per sleep period x 0-1 per sleep period New   Other issues:     PLAN     -using nad benefiting from BiPAP therapy  -continue BiPAP nightly  -BiPAP supplies ordered  -recommended contacting HME to try to find a mask that fits better (FFM)  -discussed LALITO and PAP with patient in detail, including possible complications of untreated LALITO like heart attack/stroke  -advised on strict driving precautions; advised never to drive drowsy    Advised on plan of care. Answered all patient questions. Patient verbalized understanding and voiced agreement with plan of care.       RTC 12 months or as needed       The patient was given open opportunity to ask questions and/or express concerns about treatment plan.  All questions/concerns were discussed.     Two patient identifiers used prior to evaluation.             "

## 2023-11-14 ENCOUNTER — OFFICE VISIT (OUTPATIENT)
Dept: INTERNAL MEDICINE | Facility: CLINIC | Age: 43
End: 2023-11-14
Payer: COMMERCIAL

## 2023-11-14 VITALS
WEIGHT: 315 LBS | SYSTOLIC BLOOD PRESSURE: 134 MMHG | BODY MASS INDEX: 46.42 KG/M2 | HEART RATE: 80 BPM | DIASTOLIC BLOOD PRESSURE: 90 MMHG | OXYGEN SATURATION: 98 %

## 2023-11-14 DIAGNOSIS — N52.9 ERECTILE DYSFUNCTION, UNSPECIFIED ERECTILE DYSFUNCTION TYPE: ICD-10-CM

## 2023-11-14 DIAGNOSIS — Z00.00 HEALTH MAINTENANCE EXAMINATION: Primary | ICD-10-CM

## 2023-11-14 DIAGNOSIS — G47.33 OSA (OBSTRUCTIVE SLEEP APNEA): ICD-10-CM

## 2023-11-14 DIAGNOSIS — Z86.19 HISTORY OF SYPHILIS: ICD-10-CM

## 2023-11-14 DIAGNOSIS — Z20.2 EXPOSURE TO SEXUALLY TRANSMITTED DISEASE (STD): ICD-10-CM

## 2023-11-14 DIAGNOSIS — Z12.11 SCREENING FOR COLORECTAL CANCER: ICD-10-CM

## 2023-11-14 DIAGNOSIS — Z79.899 ON PRE-EXPOSURE PROPHYLAXIS FOR HIV: ICD-10-CM

## 2023-11-14 DIAGNOSIS — Z11.4 SCREENING FOR HIV WITHOUT PRESENCE OF RISK FACTORS: ICD-10-CM

## 2023-11-14 DIAGNOSIS — D64.9 ANEMIA, UNSPECIFIED TYPE: ICD-10-CM

## 2023-11-14 DIAGNOSIS — E55.9 VITAMIN D DEFICIENCY: ICD-10-CM

## 2023-11-14 DIAGNOSIS — Z12.12 SCREENING FOR COLORECTAL CANCER: ICD-10-CM

## 2023-11-14 DIAGNOSIS — I10 ESSENTIAL HYPERTENSION: ICD-10-CM

## 2023-11-14 DIAGNOSIS — Z11.59 ENCOUNTER FOR HEPATITIS C SCREENING TEST FOR LOW RISK PATIENT: ICD-10-CM

## 2023-11-14 DIAGNOSIS — Z13.6 SCREENING FOR CARDIOVASCULAR CONDITION: ICD-10-CM

## 2023-11-14 DIAGNOSIS — E29.1 HYPOGONADISM IN MALE: ICD-10-CM

## 2023-11-14 DIAGNOSIS — R73.03 PREDIABETES: ICD-10-CM

## 2023-11-14 DIAGNOSIS — Z23 NEED FOR VACCINATION: ICD-10-CM

## 2023-11-14 PROCEDURE — 3008F BODY MASS INDEX DOCD: CPT | Mod: CPTII,S$GLB,, | Performed by: STUDENT IN AN ORGANIZED HEALTH CARE EDUCATION/TRAINING PROGRAM

## 2023-11-14 PROCEDURE — 3080F PR MOST RECENT DIASTOLIC BLOOD PRESSURE >= 90 MM HG: ICD-10-PCS | Mod: CPTII,S$GLB,, | Performed by: STUDENT IN AN ORGANIZED HEALTH CARE EDUCATION/TRAINING PROGRAM

## 2023-11-14 PROCEDURE — 99999 PR PBB SHADOW E&M-EST. PATIENT-LVL III: CPT | Mod: PBBFAC,,, | Performed by: STUDENT IN AN ORGANIZED HEALTH CARE EDUCATION/TRAINING PROGRAM

## 2023-11-14 PROCEDURE — 3075F SYST BP GE 130 - 139MM HG: CPT | Mod: CPTII,S$GLB,, | Performed by: STUDENT IN AN ORGANIZED HEALTH CARE EDUCATION/TRAINING PROGRAM

## 2023-11-14 PROCEDURE — 1159F PR MEDICATION LIST DOCUMENTED IN MEDICAL RECORD: ICD-10-PCS | Mod: CPTII,S$GLB,, | Performed by: STUDENT IN AN ORGANIZED HEALTH CARE EDUCATION/TRAINING PROGRAM

## 2023-11-14 PROCEDURE — 3075F PR MOST RECENT SYSTOLIC BLOOD PRESS GE 130-139MM HG: ICD-10-PCS | Mod: CPTII,S$GLB,, | Performed by: STUDENT IN AN ORGANIZED HEALTH CARE EDUCATION/TRAINING PROGRAM

## 2023-11-14 PROCEDURE — 3008F PR BODY MASS INDEX (BMI) DOCUMENTED: ICD-10-PCS | Mod: CPTII,S$GLB,, | Performed by: STUDENT IN AN ORGANIZED HEALTH CARE EDUCATION/TRAINING PROGRAM

## 2023-11-14 PROCEDURE — 99396 PREV VISIT EST AGE 40-64: CPT | Mod: S$GLB,,, | Performed by: STUDENT IN AN ORGANIZED HEALTH CARE EDUCATION/TRAINING PROGRAM

## 2023-11-14 PROCEDURE — 99999 PR PBB SHADOW E&M-EST. PATIENT-LVL III: ICD-10-PCS | Mod: PBBFAC,,, | Performed by: STUDENT IN AN ORGANIZED HEALTH CARE EDUCATION/TRAINING PROGRAM

## 2023-11-14 PROCEDURE — 99396 PR PREVENTIVE VISIT,EST,40-64: ICD-10-PCS | Mod: S$GLB,,, | Performed by: STUDENT IN AN ORGANIZED HEALTH CARE EDUCATION/TRAINING PROGRAM

## 2023-11-14 PROCEDURE — 1159F MED LIST DOCD IN RCRD: CPT | Mod: CPTII,S$GLB,, | Performed by: STUDENT IN AN ORGANIZED HEALTH CARE EDUCATION/TRAINING PROGRAM

## 2023-11-14 PROCEDURE — 3080F DIAST BP >= 90 MM HG: CPT | Mod: CPTII,S$GLB,, | Performed by: STUDENT IN AN ORGANIZED HEALTH CARE EDUCATION/TRAINING PROGRAM

## 2023-11-14 RX ORDER — ACETAMINOPHEN 500 MG
TABLET ORAL
Qty: 1 EACH | Refills: 0 | Status: SHIPPED | OUTPATIENT
Start: 2023-11-14

## 2023-11-14 RX ORDER — ANASTROZOLE 1 MG/1
1 TABLET ORAL
COMMUNITY
Start: 2023-07-06

## 2023-11-14 RX ORDER — TADALAFIL 20 MG/1
20 TABLET ORAL DAILY PRN
COMMUNITY
Start: 2023-09-15

## 2023-11-14 RX ORDER — CHOLECALCIFEROL (VITAMIN D3) 25 MCG
1000 TABLET ORAL DAILY
COMMUNITY

## 2023-11-14 NOTE — PROGRESS NOTES
Subjective:       Patient ID: Baudilio Potts is a 43 y.o. male.    Chief Complaint: Health maintenance examination [Z00.00]    Patient is established with me, here today for the following:    Health maintenance -   Never previously had colorectal cancer screening.  Denies family history of colorectal cancer.   Significant family history of cardiac disease.   Denies family history of prostate cancer.  UTD on Tdap, COVID primary/booster, hepatitis B vaccinations.  Due for COVID, HPV vaccinations.  Never smoker.  Drinks alcohol 1 time monthly, 1-3 drinks per sitting.  Denies drug use.  Currently sexually active with .  Agreeable to routine STI testing.  Completed HIV and hepatitis C screening.  Due for lipid screening.  Lab Results       Component                Value               Date                       LDLCALC                  120.6               12/05/2022            The 10-year ASCVD risk score (Josr RAWLS, et al., 2019) is: 8.3%     Endorses exercising routinely.  Strength based exercises twice weekly with .     Prediabetes -   Due for diabetes screening.  Lab Results       Component                Value               Date                       HGBA1C                   5.2                 12/05/2022                 HGBA1C                   5.7 (H)             12/22/2020                 HGBA1C                   5.3                 10/25/2013                     HTN -   Currently prescribed amlodipine.  Patient endorses taking medication as directed.  Denies side effects or concerns while taking medication.  TSH WNL  Due for micro albumin creatinine ratio.   Lab Results       Component                Value               Date                       MICALBCREAT              18.9                12/05/2022            BP Readings from Last 5 Encounters:  10/04/23 : 137/89  10/03/23 : (!) 143/89  09/22/23 : 130/84  08/08/23 : (!) 136/98  02/16/23 : (!) 145/92     PrEP -  Taking Truvada daily as directed,  receiving through [facility]  Completed hepatitis B vaccinations.  Due for HPV, hepatitis A vaccinations.  Denies current signs or symptoms of STI infection  Denies acute signs or symptoms of HIV infection since last visit  Lab Results       Component                Value               Date                       [id number]                Non-reactive        12/05/2022            Lab Results       Component                Value               Date                       CREATININE               1.3                 12/05/2022              Following with Dr. Lopez for testosterone therapy  Receiving testosterone injections once weekly  Taking anastrozole as well   Taking Cialis for ED with good effect    Vitamin D deficiency -  Currently taking vitamin D3 supplementation daily.   Lab Results       Component                Value               Date                       [id number]             6 (L)               12/05/2022              Following with [facility] Lind routinely for sleep medicine.  Using BiPAP for LALITO with good effect    Anemia -  Sister has anemia as well  Lab Results       Component                Value               Date                       HGB                      13.9 (L)            12/07/2022                 HCT                      41.2                12/07/2022                 MCV                      94                  12/07/2022                 RDW                      13.2                12/07/2022            Lab Results       Component                Value               Date                       IRON                     103                 12/07/2022                 TRANSFERRIN              244                 12/07/2022                 TIBC                     361                 12/07/2022                 FESATURATED              29                  12/07/2022             Lab Results       Component                Value               Date                       FERRITIN                 215                  12/07/2022            Lab Results       Component                Value               Date                       [id number]               474                 12/07/2022            Lab Results       Component                Value               Date                       FOLATE                   7.7                 12/07/2022              History of syphilis -   Treated with PCN IM x3 doses        Review of Systems   Constitutional:  Negative for chills, fatigue, fever and unexpected weight change.   Respiratory:  Negative for cough and shortness of breath.    Cardiovascular:  Negative for chest pain, palpitations and leg swelling.   Neurological:  Negative for dizziness, syncope and headaches.         Current Outpatient Medications   Medication Instructions    amlodipine (NORVASC) 10 MG tablet TAKE 1 TABLET BY MOUTH EVERY DAY    citalopram (CELEXA) 20 mg, Oral, Daily    emtricitabine-tenofovir 200-300 mg (TRUVADA) 200-300 mg Tab 1 tablet, Oral, Daily     Objective:      Vitals:    11/14/23 0903   BP: (!) 134/90   Pulse: 80   SpO2: 98%   Weight: (!) 164 kg (361 lb 8.9 oz)     Body mass index is 46.42 kg/m².    Physical Exam  Vitals reviewed.   Constitutional:       General: He is not in acute distress.     Appearance: Normal appearance. He is not ill-appearing or diaphoretic.   HENT:      Head: Normocephalic and atraumatic.      Right Ear: Tympanic membrane, ear canal and external ear normal. There is no impacted cerumen.      Left Ear: Tympanic membrane, ear canal and external ear normal. There is no impacted cerumen.      Nose: Nose normal. No rhinorrhea.      Mouth/Throat:      Mouth: Mucous membranes are moist.      Pharynx: Oropharynx is clear. No oropharyngeal exudate or posterior oropharyngeal erythema.   Eyes:      General: No scleral icterus.        Right eye: No discharge.         Left eye: No discharge.      Conjunctiva/sclera: Conjunctivae normal.   Neck:      Thyroid: No thyromegaly or thyroid  tenderness.      Trachea: Trachea normal.   Cardiovascular:      Rate and Rhythm: Normal rate and regular rhythm.      Heart sounds: Normal heart sounds. No murmur heard.     No friction rub. No gallop.   Pulmonary:      Effort: Pulmonary effort is normal. No respiratory distress.      Breath sounds: Normal breath sounds. No stridor. No wheezing, rhonchi or rales.   Abdominal:      General: Bowel sounds are normal. There is no distension.      Palpations: Abdomen is soft.      Tenderness: There is no abdominal tenderness. There is no guarding or rebound.   Musculoskeletal:         General: No swelling or deformity.      Cervical back: Neck supple.   Lymphadenopathy:      Head:      Right side of head: No submandibular or posterior auricular adenopathy.      Left side of head: No submandibular or posterior auricular adenopathy.      Cervical: No cervical adenopathy.      Right cervical: No superficial, deep or posterior cervical adenopathy.     Left cervical: No superficial, deep or posterior cervical adenopathy.      Upper Body:      Right upper body: No supraclavicular adenopathy.      Left upper body: No supraclavicular adenopathy.   Skin:     General: Skin is warm and dry.   Neurological:      General: No focal deficit present.      Mental Status: He is alert. Mental status is at baseline.      Gait: Gait normal.   Psychiatric:         Mood and Affect: Mood normal.         Behavior: Behavior normal.         Assessment:       1. Health maintenance examination    2. Prediabetes    3. Essential hypertension    4. On pre-exposure prophylaxis for HIV    5. Hypogonadism in male    6. Erectile dysfunction, unspecified erectile dysfunction type    7. LALITO (obstructive sleep apnea)    8. Anemia, unspecified type    9. Vitamin D deficiency    10. History of syphilis    11. Need for vaccination    12. Screening for cardiovascular condition    13. Screening for colorectal cancer    14. Screening for HIV without presence of  risk factors    15. Encounter for hepatitis C screening test for low risk patient    16. Exposure to sexually transmitted disease (STD)        Plan:       Prediabetes  -     Hemoglobin A1C; Future    Essential hypertension  Continue current medications.  Discussed diet and lifestyle modifications for BP control.  Provided low sodium diet handout.  Provided handout on how to check BP at home.  Check BP at home 3-4 times weekly, keep log for review.   Contact office with home blood pressure logs in 2-3 weeks.   RTC in 6 months for follow up.  -     blood pressure monitor Kit; Use daily to check blood pressure as directed.  -     Aldosterone/Renin Activity Ratio; Future  -     Comprehensive Metabolic Panel; Future  -     TSH; Future  -     CBC Auto Differential; Future  -     Microalbumin/Creatinine Ratio, Urine; Future    On pre-exposure prophylaxis for HIV  Continue current medications.  RTC in 6 months for follow up.    Hypogonadism in male  Erectile dysfunction, unspecified erectile dysfunction type  Continue evaluation and management per urologist.    LALITO (obstructive sleep apnea)  Continue evaluation and management per sleep medicine.    Anemia, unspecified type  -     Vitamin B12; Future  -     Folate; Future  -     Iron and TIBC; Future  -     Ferritin; Future    Vitamin D deficiency  -     Vitamin D; Future    History of syphilis  -     RPR; Future    Health maintenance examination  Reviewed and discussed age appropriate screenings and immunizations.  -     Aldosterone/Renin Activity Ratio; Future  -     Comprehensive Metabolic Panel; Future  -     TSH; Future  -     Lipid Panel; Future  -     Hemoglobin A1C; Future  -     CBC Auto Differential; Future  -     Vitamin D; Future  -     RPR; Future  -     HIV 1/2 Ag/Ab (4th Gen); Future  -     Hepatitis C Antibody; Future  -     Hepatitis B Surface Antigen; Future  -     C. trachomatis/N. gonorrhoeae by AMP DNA [facility]; Urine; Future  -     Vitamin B12; Future  -      Folate; Future  -     Iron and TIBC; Future  -     Ferritin; Future    Need for vaccination  Discussed recommended vaccinations and how to obtain.    Screening for cardiovascular condition  -     Lipid Panel; Future    Screening for colorectal cancer  -     Cologuard Screening (Multitarget Stool DNA); Future  -     Cologuard Screening (Multitarget Stool DNA)    Screening for HIV without presence of risk factors  -     HIV 1/2 Ag/Ab (4th Gen); Future    Encounter for hepatitis C screening test for low risk patient  -     Hepatitis C Antibody; Future    Exposure to sexually transmitted disease (STD)  -     RPR; Future  -     Hepatitis B Surface Antigen; Future  -     C. trachomatis/N. gonorrhoeae by AMP DNA [facility]; Urine; Future      Tammy Rivera MD  11/14/2023

## 2023-11-18 PROBLEM — E55.9 VITAMIN D DEFICIENCY: Status: ACTIVE | Noted: 2023-11-18

## 2023-11-18 PROBLEM — N52.9 ERECTILE DYSFUNCTION: Status: ACTIVE | Noted: 2023-11-18

## 2023-11-18 PROBLEM — E29.1 HYPOGONADISM IN MALE: Status: ACTIVE | Noted: 2023-11-18

## 2023-11-18 PROBLEM — D64.9 ANEMIA: Status: ACTIVE | Noted: 2023-11-18

## 2023-11-18 PROBLEM — Z86.19 HISTORY OF SYPHILIS: Status: ACTIVE | Noted: 2023-11-18

## 2023-11-30 ENCOUNTER — TELEPHONE (OUTPATIENT)
Dept: ADMINISTRATIVE | Facility: HOSPITAL | Age: 43
End: 2023-11-30
Payer: COMMERCIAL

## 2023-11-30 ENCOUNTER — PATIENT MESSAGE (OUTPATIENT)
Dept: ADMINISTRATIVE | Facility: HOSPITAL | Age: 43
End: 2023-11-30
Payer: COMMERCIAL

## 2023-12-04 LAB — NONINV COLON CA DNA+OCC BLD SCRN STL QL: NEGATIVE

## 2023-12-11 ENCOUNTER — TELEPHONE (OUTPATIENT)
Dept: ADMINISTRATIVE | Facility: HOSPITAL | Age: 43
End: 2023-12-11
Payer: COMMERCIAL

## 2024-02-01 VITALS — DIASTOLIC BLOOD PRESSURE: 86 MMHG | SYSTOLIC BLOOD PRESSURE: 132 MMHG

## 2024-05-06 ENCOUNTER — TELEPHONE (OUTPATIENT)
Dept: INTERNAL MEDICINE | Facility: CLINIC | Age: 44
End: 2024-05-06
Payer: COMMERCIAL

## 2024-05-06 NOTE — TELEPHONE ENCOUNTER
----- Message from Genesis Degroot sent at 5/6/2024 12:54 PM CDT -----  Regarding: Authroization  Type: Patient Call Back    Who called: p/t     What is the request in detail: p/t is calling b/c p/t has another provider at [facility] who states they need authorization to put p/t on rx Trizapetide. Please call to assist.     Can the clinic reply by [facility]? Yes     Would the patient rather a call back or a response via [facility]?     Best call back number: [phone] (home)       Additional Information:

## 2024-06-06 ENCOUNTER — OFFICE VISIT (OUTPATIENT)
Dept: OPTOMETRY | Facility: CLINIC | Age: 44
End: 2024-06-06
Attending: FAMILY MEDICINE
Payer: COMMERCIAL

## 2024-06-06 DIAGNOSIS — H52.03 HYPEROPIA WITH PRESBYOPIA OF BOTH EYES: ICD-10-CM

## 2024-06-06 DIAGNOSIS — Z01.00 ROUTINE EYE EXAM: Primary | ICD-10-CM

## 2024-06-06 DIAGNOSIS — H52.4 HYPEROPIA WITH PRESBYOPIA OF BOTH EYES: ICD-10-CM

## 2024-06-06 PROCEDURE — 92015 DETERMINE REFRACTIVE STATE: CPT | Mod: S$GLB,,, | Performed by: OPTOMETRIST

## 2024-06-06 PROCEDURE — 99999 PR PBB SHADOW E&M-EST. PATIENT-LVL III: CPT | Mod: PBBFAC,,, | Performed by: OPTOMETRIST

## 2024-06-06 PROCEDURE — 92004 COMPRE OPH EXAM NEW PT 1/>: CPT | Mod: S$GLB,,, | Performed by: OPTOMETRIST

## 2024-06-06 PROCEDURE — 1159F MED LIST DOCD IN RCRD: CPT | Mod: CPTII,S$GLB,, | Performed by: OPTOMETRIST

## 2024-06-06 NOTE — PROGRESS NOTES
HPI    Annual Exam   Pt states vision is poor @ [facility]  OTC +0.75    Pt denies F/F     Pt reports  Occasion Dry/ Itchy/ Burning  Gtt: No    Last edited by Eileen Ramirez on 6/6/2024  7:50 AM.            Assessment /Plan     For exam results, see Encounter Report.    Routine eye exam  -     Ambulatory referral/consult to Optometry  -Annual DFE, good ocular health    Hyperopia with presbyopia of both eyes  Eyeglass Final Rx       Eyeglass Final Rx         Sphere Cylinder Dist [facility]    Right +1.00 Sphere 20/20    Left +1.00 Sphere 20/20      Type: SVL    Expiration Date: 6/6/2025                      RTC 1 yr

## 2024-07-03 ENCOUNTER — HOSPITAL ENCOUNTER (OUTPATIENT)
Dept: RADIOLOGY | Facility: OTHER | Age: 44
Discharge: HOME | End: 2024-07-03
Attending: STUDENT IN AN ORGANIZED HEALTH CARE EDUCATION/TRAINING PROGRAM
Payer: COMMERCIAL

## 2024-07-03 ENCOUNTER — OFFICE VISIT (OUTPATIENT)
Dept: INTERNAL MEDICINE | Facility: CLINIC | Age: 44
End: 2024-07-03
Payer: COMMERCIAL

## 2024-07-03 VITALS
OXYGEN SATURATION: 98 % | HEART RATE: 71 BPM | SYSTOLIC BLOOD PRESSURE: 138 MMHG | WEIGHT: 315 LBS | BODY MASS INDEX: 44.27 KG/M2 | DIASTOLIC BLOOD PRESSURE: 92 MMHG

## 2024-07-03 DIAGNOSIS — Z11.4 SCREENING FOR HIV WITHOUT PRESENCE OF RISK FACTORS: ICD-10-CM

## 2024-07-03 DIAGNOSIS — Z11.59 ENCOUNTER FOR HEPATITIS C SCREENING TEST FOR LOW RISK PATIENT: ICD-10-CM

## 2024-07-03 DIAGNOSIS — Z00.00 HEALTH MAINTENANCE EXAMINATION: Primary | ICD-10-CM

## 2024-07-03 DIAGNOSIS — E66.01 CLASS 3 SEVERE OBESITY DUE TO EXCESS CALORIES WITH SERIOUS COMORBIDITY AND BODY MASS INDEX (BMI) OF 45.0 TO 49.9 IN ADULT: ICD-10-CM

## 2024-07-03 DIAGNOSIS — G47.33 OSA (OBSTRUCTIVE SLEEP APNEA): ICD-10-CM

## 2024-07-03 DIAGNOSIS — S86.001A INJURY OF RIGHT ACHILLES TENDON, INITIAL ENCOUNTER: ICD-10-CM

## 2024-07-03 DIAGNOSIS — R73.03 PREDIABETES: ICD-10-CM

## 2024-07-03 DIAGNOSIS — E29.1 HYPOGONADISM IN MALE: ICD-10-CM

## 2024-07-03 DIAGNOSIS — Z20.2 EXPOSURE TO SEXUALLY TRANSMITTED DISEASE (STD): ICD-10-CM

## 2024-07-03 DIAGNOSIS — I10 ESSENTIAL HYPERTENSION: ICD-10-CM

## 2024-07-03 DIAGNOSIS — D64.9 ANEMIA, UNSPECIFIED TYPE: ICD-10-CM

## 2024-07-03 DIAGNOSIS — Z23 NEED FOR VACCINATION: ICD-10-CM

## 2024-07-03 DIAGNOSIS — Z79.899 ON PRE-EXPOSURE PROPHYLAXIS FOR HIV: ICD-10-CM

## 2024-07-03 DIAGNOSIS — F41.1 GENERALIZED ANXIETY DISORDER: ICD-10-CM

## 2024-07-03 DIAGNOSIS — E55.9 VITAMIN D DEFICIENCY: ICD-10-CM

## 2024-07-03 DIAGNOSIS — Z13.6 SCREENING FOR CARDIOVASCULAR CONDITION: ICD-10-CM

## 2024-07-03 DIAGNOSIS — N52.9 ERECTILE DYSFUNCTION, UNSPECIFIED ERECTILE DYSFUNCTION TYPE: ICD-10-CM

## 2024-07-03 PROCEDURE — 99999 PR PBB SHADOW E&M-EST. PATIENT-LVL IV: CPT | Mod: PBBFAC,,, | Performed by: STUDENT IN AN ORGANIZED HEALTH CARE EDUCATION/TRAINING PROGRAM

## 2024-07-03 PROCEDURE — 73610 X-RAY EXAM OF ANKLE: CPT | Mod: TC,FY,RT

## 2024-07-03 PROCEDURE — 73610 X-RAY EXAM OF ANKLE: CPT | Mod: 26,RT,, | Performed by: RADIOLOGY

## 2024-07-03 RX ORDER — CITALOPRAM 40 MG/1
40 TABLET, FILM COATED ORAL DAILY
Qty: 90 TABLET | Refills: 3 | Status: SHIPPED | OUTPATIENT
Start: 2024-07-03

## 2024-07-03 NOTE — PROGRESS NOTES
Subjective:       Patient ID: Baudilio Potts is a 44 y.o. male.    Chief Complaint: Health maintenance examination [Z00.00]    Patient is established with me, here today for the following:     Health maintenance -   Cologuard negative NOV2023.   Denies family history of colorectal cancer.   Significant family history of cardiac disease.   Denies family history of prostate cancer.  UTD on Tdap, COVID primary/booster, hepatitis B vaccinations.  Due for COVID, HPV, hepatitis A vaccinations.  Never smoker.  Denies drug use.  Currently sexually active with .  Agreeable to routine STI testing.  Completed HIV and hepatitis C screening.  Due for lipid screening.  Lab Results       Component                Value               Date                       LDLCALC                  120.6               12/05/2022            The 10-year ASCVD risk score (Josr RAWLS, et al., 2019) is: 8.2%      Prediabetes -   Due for diabetes screening.  Lab Results       Component                Value               Date                       HGBA1C                   5.2                 12/05/2022                 HGBA1C                   5.7 (H)             12/22/2020                 HGBA1C                   5.3                 10/25/2013              Taking Zepbound 2.5 mg for weight reduction with good effect            Started medication around end of MAY2024     Wt Readings from Last 5 Encounters:  07/03/24 : (!) 156.4 kg (344 lb 12.8 oz)  11/14/23 : (!) 164 kg (361 lb 8.9 oz)  10/04/23 : (!) 144 kg (317 lb 7.4 oz)  10/03/23 : (!) 144.2 kg (318 lb)  08/08/23 : (!) 169.2 kg (372 lb 14.5 oz)    HTN -   Currently prescribed amlodipine.  Patient endorses taking medication as directed.  Denies side effects or concerns while taking medication.  TSH WNL  Due for micro albumin creatinine ratio.   Lab Results       Component                Value               Date                       MICALBCREAT              18.9                12/05/2022             BP Readings from Last 5 Encounters:  02/01/24 : 132/86  11/14/23 : (!) 134/90  10/04/23 : 137/89  10/03/23 : (!) 143/89  09/22/23 : 130/84     Taking Celexa for anxiety with incomplete effect  Experiencing worsening anxiety since having panic attack while flying    PrEP -  Taking Truvada as directed  Receiving medication through [facility]  Completed hepatitis B vaccinations.  Due for HPV, hepatitis A vaccinations.  Denies current signs or symptoms of STI infection  Denies acute signs or symptoms of HIV infection since last visit  Lab Results       Component                Value               Date                       [id number]                Non-reactive        12/05/2022            Lab Results       Component                Value               Date                       CREATININE               1.3                 12/05/2022                    Following with Dr. Robertson for testosterone therapy  Receiving testosterone injections once weekly  Taking anastrozole as well   Taking Cialis for ED with good effect     Vitamin D deficiency -  Interittently taking vitamin D3 supplementation.   Lab Results       Component                Value               Date                       [id number]             6 (L)               12/05/2022               Following with [facility] Lind for sleep medicine.  Using BiPAP for LALITO with good effect     Anemia -  Sister has anemia as well  Lab Results       Component                Value               Date                       HGB                      13.9 (L)            12/07/2022                 HCT                      41.2                12/07/2022                 MCV                      94                  12/07/2022                 RDW                      13.2                12/07/2022            Lab Results       Component                Value               Date                       IRON                     103                 12/07/2022                 TRANSFERRIN               244                 12/07/2022                 TIBC                     361                 12/07/2022                 FESATURATED              29                  12/07/2022             Lab Results       Component                Value               Date                       FERRITIN                 215                 12/07/2022            Lab Results       Component                Value               Date                       [id number]               474                 12/07/2022            Lab Results       Component                Value               Date                       FOLATE                   7.7                 12/07/2022                   Was working with  doing calf exercises around JAN2024  Began with right sided achilles pain afterwards  Fluctuating pain intensity, but experiencing pain daily  Worse with standing and walking          Review of Systems   Constitutional:  Negative for chills, fatigue, fever and unexpected weight change.   Respiratory:  Negative for cough and shortness of breath.    Cardiovascular:  Negative for chest pain, palpitations and leg swelling.   Gastrointestinal:  Negative for abdominal pain, constipation, diarrhea, nausea and vomiting.   Musculoskeletal:  Positive for arthralgias and myalgias.   Neurological:  Negative for dizziness, syncope and headaches.         Current Outpatient Medications   Medication Instructions    amlodipine (NORVASC) 10 MG tablet TAKE 1 TABLET BY MOUTH EVERY DAY    anastrozole (ARIMIDEX) 1 mg, Oral, Every 7 days    blood pressure monitor Kit Use daily to check blood pressure as directed.    citalopram (CELEXA) 20 mg, Oral, Daily    emtricitabine-tenofovir 200-300 mg (TRUVADA) 200-300 mg Tab 1 tablet, Oral, Daily    tadalafiL (CIALIS) 20 mg, Oral, Daily PRN    tirzepatide 7.5 mg/0.5 mL PnIj     vitamin D (VITAMIN D3) 1,000 Units, Oral, Daily     Objective:      Vitals:    07/03/24 0904   BP: (!) 138/92   Pulse: 71   SpO2: 98%   Weight:  (!) 156.4 kg (344 lb 12.8 oz)   PainSc: 0-No pain     Body mass index is 44.27 kg/m².    Physical Exam  Vitals reviewed.   Constitutional:       General: He is not in acute distress.     Appearance: Normal appearance. He is not ill-appearing or diaphoretic.   HENT:      Head: Normocephalic and atraumatic.      Right Ear: Tympanic membrane, ear canal and external ear normal. There is no impacted cerumen.      Left Ear: Tympanic membrane, ear canal and external ear normal. There is no impacted cerumen.      Nose: Nose normal. No rhinorrhea.      Mouth/Throat:      Mouth: Mucous membranes are moist.      Pharynx: Oropharynx is clear. No oropharyngeal exudate or posterior oropharyngeal erythema.   Eyes:      General: No scleral icterus.        Right eye: No discharge.         Left eye: No discharge.      Conjunctiva/sclera: Conjunctivae normal.   Neck:      Thyroid: No thyromegaly or thyroid tenderness.      Trachea: Trachea normal.   Cardiovascular:      Rate and Rhythm: Normal rate and regular rhythm.      Heart sounds: Normal heart sounds. No murmur heard.     No friction rub. No gallop.   Pulmonary:      Effort: Pulmonary effort is normal. No respiratory distress.      Breath sounds: Normal breath sounds. No stridor. No wheezing, rhonchi or rales.   Abdominal:      General: Bowel sounds are normal. There is no distension.      Palpations: Abdomen is soft.      Tenderness: There is no abdominal tenderness. There is no guarding or rebound.   Musculoskeletal:         General: No swelling or deformity.      Cervical back: Neck supple.      Right ankle: No swelling or deformity. No tenderness. Normal range of motion.      Right Achilles Tendon: No tenderness or defects. Mcqueen's test negative.   Lymphadenopathy:      Head:      Right side of head: No submandibular or posterior auricular adenopathy.      Left side of head: No submandibular or posterior auricular adenopathy.      Cervical: No cervical adenopathy.       Right cervical: No superficial, deep or posterior cervical adenopathy.     Left cervical: No superficial, deep or posterior cervical adenopathy.      Upper Body:      Right upper body: No supraclavicular adenopathy.      Left upper body: No supraclavicular adenopathy.   Skin:     General: Skin is warm and dry.   Neurological:      General: No focal deficit present.      Mental Status: He is alert. Mental status is at baseline.      Gait: Gait normal.   Psychiatric:         Mood and Affect: Mood normal.         Behavior: Behavior normal.         Assessment:       1. Health maintenance examination    2. Prediabetes    3. Class 3 severe obesity due to excess calories with serious comorbidity and body mass index (BMI) of 45.0 to 49.9 in adult    4. Essential hypertension    5. Generalized anxiety disorder    6. On pre-exposure prophylaxis for HIV    7. Hypogonadism in male    8. Erectile dysfunction, unspecified erectile dysfunction type    9. Vitamin D deficiency    10. LALITO (obstructive sleep apnea)    11. Anemia, unspecified type    12. Injury of right Achilles tendon, initial encounter    13. Need for vaccination    14. Screening for cardiovascular condition    15. Screening for HIV without presence of risk factors    16. Encounter for hepatitis C screening test for low risk patient    17. Exposure to sexually transmitted disease (STD)        Plan:       Prediabetes  Continue healthy diet and lifestyle modifications  RTC in 6-8 weeks for follow up.  -     Comprehensive Metabolic Panel; Future  -     Hemoglobin A1C; Future    Class 3 severe obesity due to excess calories with serious comorbidity and body mass index (BMI) of 45.0 to 49.9 in adult  Continue current medications.  RTC in 6-8 weeks for follow up.    Essential hypertension  Check BP at home 3-4 times weekly, keep log for review.   Contact office with home blood pressure logs in 2-3 weeks.   RTC in 6-8 weeks for follow up.  -     Comprehensive Metabolic  Panel; Future  -     TSH; Future  -     CBC Auto Differential; Future  -     Microalbumin/Creatinine Ratio, Urine; Future  -     Aldosterone/Renin Activity Ratio; Future    Generalized anxiety disorder  Increase Celexa to 40 mg  Recommend starting counseling, patient agreeable with plan  List of local mental health resources provided   RTC in 6-8 weeks for follow up appointment  -     citalopram (CELEXA) 40 MG tablet; Take 1 tablet (40 mg total) by mouth once daily.  -     Ambulatory referral/consult to Psychology; Future    On pre-exposure prophylaxis for HIV  Continue current medications.  RTC in 6-8 weeks for follow up.    Hypogonadism in male  Erectile dysfunction, unspecified erectile dysfunction type  Continue medication, evaluation, and management per clinic.    Vitamin D deficiency  -     Vitamin D; Future    LALITO (obstructive sleep apnea)  Continue evaluation and management per sleep medicine.    Anemia, unspecified type  -     CBC Auto Differential; Future  -     Pathologist Interpretation Differential; Future  -     Vitamin B12; Future  -     Folate; Future  -     Reticulocytes; Future  -     Iron and TIBC; Future  -     Ferritin; Future    Injury of right Achilles tendon, initial encounter  Discussed conservative treatment at home: gentle stretching and NSAIDs PRN.  Provided handout with home stretches and exercises. Do not perform stretches/exercises if they cause overt pain.  RTC in pain unimproved or worsening in the next 1-2 weeks.   -     X-Ray Ankle Complete 3 View Right; Future  -     Ambulatory referral/consult to Orthopedics; Future    Health maintenance examination  Reviewed and discussed age appropriate screenings and immunizations.  -     Comprehensive Metabolic Panel; Future  -     TSH; Future  -     Lipid Panel; Future  -     Hemoglobin A1C; Future  -     CBC Auto Differential; Future  -     Microalbumin/Creatinine Ratio, Urine; Future  -     Vitamin D; Future  -     Pathologist  Interpretation Differential; Future  -     Vitamin B12; Future  -     Folate; Future  -     Reticulocytes; Future  -     Iron and TIBC; Future  -     Ferritin; Future  -     HIV 1/2 Ag/Ab (4th Gen); Future  -     Hepatitis B Surface Antigen; Future  -     Hepatitis C Antibody; Future  -     C. trachomatis/N. gonorrhoeae by AMP DNA [facility]; Urine; Future  -     RPR (for monitoring); Future  -     Aldosterone/Renin Activity Ratio; Future    Need for vaccination  Discussed recommended vaccinations and how to obtain.    Screening for cardiovascular condition  -     Lipid Panel; Future    Screening for HIV without presence of risk factors  -     HIV 1/2 Ag/Ab (4th Gen); Future    Encounter for hepatitis C screening test for low risk patient  -     Hepatitis C Antibody; Future    Exposure to sexually transmitted disease (STD)  -     Hepatitis B Surface Antigen; Future  -     C. trachomatis/N. gonorrhoeae by AMP DNA [facility]; Urine; Future  -     RPR (for monitoring); Future      Tammy Rivera MD  7/3/2024

## 2024-07-03 NOTE — PATIENT INSTRUCTIONS
Call to schedule with [facility] psychiatry or psychology: [phone] or [phone]    [facility]: [phone]     Therapy/Psychology:   You may also call your insurance to find providers who are covered in your area.    [facility]   Phone: [phone]    [facility] Behavioral Therapy (CBT) Center [facility]   Address: [address] [place] [address]   Phone: [phone]   [email or web address]     [facility] [address], [address]   Phone: [phone]   You can email for an appointment at: [email or web address]     [facility]   [address] [facility] [address], [facility], [address]   [email or web address]   Dr. Melissa Tuttle, [phone] or [email or web address]   Dr. Denise Hendrickson, [phone] or [email or web address]     Patricia Gage    LCSW (therapist) [phone]   [address] [facility]   Karyna Rashel   LCSW (therapist) [phone]   [address] [facility]   Daisha Mcfadden  LCSW (therapist) [phone]   [address] [facility]   ORIANAElaina Boston          LCSW (therapist) [phone]   Anish Brown [phone] (therapist) [address] [facility]   Marcelo Kiser (therapist) [phone]  [facility] [place] Marsha Higuera (therapist) [phone] [address] [facility]     [facility] [phone]   [facility] [place] [address]     Online Therapist:     [email or web address]   [email or web address]     Free Guided Meditations   [email or web address]   [email or web address]   [email or web address]

## 2024-08-12 ENCOUNTER — TELEPHONE (OUTPATIENT)
Dept: ORTHOPEDICS | Facility: CLINIC | Age: 44
End: 2024-08-12
Payer: COMMERCIAL

## 2024-08-12 ENCOUNTER — PATIENT MESSAGE (OUTPATIENT)
Dept: ORTHOPEDICS | Facility: CLINIC | Age: 44
End: 2024-08-12
Payer: COMMERCIAL

## 2024-08-15 ENCOUNTER — OFFICE VISIT (OUTPATIENT)
Dept: ORTHOPEDICS | Facility: CLINIC | Age: 44
End: 2024-08-15
Payer: COMMERCIAL

## 2024-08-15 VITALS — WEIGHT: 315 LBS | HEIGHT: 74 IN | BODY MASS INDEX: 40.43 KG/M2

## 2024-08-15 DIAGNOSIS — M76.61 RIGHT ACHILLES TENDINITIS: ICD-10-CM

## 2024-08-15 PROCEDURE — 3044F HG A1C LEVEL LT 7.0%: CPT | Mod: CPTII,S$GLB,, | Performed by: ORTHOPAEDIC SURGERY

## 2024-08-15 PROCEDURE — 3008F BODY MASS INDEX DOCD: CPT | Mod: CPTII,S$GLB,, | Performed by: ORTHOPAEDIC SURGERY

## 2024-08-15 PROCEDURE — 3061F NEG MICROALBUMINURIA REV: CPT | Mod: CPTII,S$GLB,, | Performed by: ORTHOPAEDIC SURGERY

## 2024-08-15 PROCEDURE — 1160F RVW MEDS BY RX/DR IN RCRD: CPT | Mod: CPTII,S$GLB,, | Performed by: ORTHOPAEDIC SURGERY

## 2024-08-15 PROCEDURE — 99203 OFFICE O/P NEW LOW 30 MIN: CPT | Mod: S$GLB,,, | Performed by: ORTHOPAEDIC SURGERY

## 2024-08-15 PROCEDURE — 3066F NEPHROPATHY DOC TX: CPT | Mod: CPTII,S$GLB,, | Performed by: ORTHOPAEDIC SURGERY

## 2024-08-15 PROCEDURE — 99999 PR PBB SHADOW E&M-EST. PATIENT-LVL III: CPT | Mod: PBBFAC,,, | Performed by: ORTHOPAEDIC SURGERY

## 2024-08-15 PROCEDURE — 1159F MED LIST DOCD IN RCRD: CPT | Mod: CPTII,S$GLB,, | Performed by: ORTHOPAEDIC SURGERY

## 2024-08-15 NOTE — PROGRESS NOTES
"DATE: 8/15/2024  PATIENT: Baudilio Potts    CHIEF COMPLAINT: right heel pain    HISTORY:  Baudilio Potts is a 44 y.o. male here for evaluation of right heel pain. He reports the pain started in February when he was doing an intense, high-impact work out. He denies an injury or feeling a "pop", the pain cam one suddenly. The pain initially got worse, then has been getting better over the past few months. He has been doing some calf stretches which he believes helps, and he has been taking tylenol as needed for pain.       PAST MEDICAL/SURGICAL HISTORY:  Past Medical History:   Diagnosis Date    Hypertension      Past Surgical History:   Procedure Laterality Date    WISDOM TOOTH EXTRACTION         Current Medications:   Current Outpatient Medications:     amlodipine (NORVASC) 10 MG tablet, TAKE 1 TABLET BY MOUTH EVERY DAY, Disp: 90 tablet, Rfl: 1    anastrozole (ARIMIDEX) 1 mg Tab, Take 1 mg by mouth every 7 days., Disp: , Rfl:     blood pressure monitor Kit, Use daily to check blood pressure as directed., Disp: 1 each, Rfl: 0    citalopram (CELEXA) 40 MG tablet, Take 1 tablet (40 mg total) by mouth once daily., Disp: 90 tablet, Rfl: 3    emtricitabine-tenofovir 200-300 mg (TRUVADA) 200-300 mg Tab, Take 1 tablet by mouth once daily., Disp: , Rfl:     tadalafiL (CIALIS) 20 MG Tab, Take 20 mg by mouth daily as needed (ED)., Disp: , Rfl:     tirzepatide 7.5 mg/0.5 mL PnIj, , Disp: , Rfl:     vitamin D (VITAMIN D3) 1000 units Tab, Take 1,000 Units by mouth once daily., Disp: , Rfl:     Social History:   Social History     Socioeconomic History    Marital status:    Tobacco Use    Smoking status: Never    Smokeless tobacco: Never   Substance and Sexual Activity    Alcohol use: Yes     Alcohol/week: 1.0 standard drink of alcohol     Types: 1 Shots of liquor per week     Comment: social    Drug use: Never    Sexual activity: Yes     Partners: Male     Birth control/protection: Other-see comments     Comment: Pre " "exposure prophylaxis     Social Determinants of Health     Financial Resource Strain: Low Risk  (7/3/2024)    Overall Financial Resource Strain (CARDIA)     Difficulty of Paying Living Expenses: Not very hard   Food Insecurity: No Food Insecurity (7/3/2024)    Hunger Vital Sign     Worried About Running Out of Food in the Last Year: Never true     Ran Out of Food in the Last Year: Never true   Transportation Needs: No Transportation Needs (6/22/2022)    [facility]    PRAPARE - Transportation     Lack of Transportation (Medical): No     Lack of Transportation (Non-Medical): No   Physical Activity: Unknown (7/3/2024)    Exercise Vital Sign     Days of Exercise per Week: 0 days   Stress: Stress Concern Present (7/3/2024)    [employer] [place] [employer] - Occupational Stress Questionnaire     Feeling of Stress : Rather much   Housing Stability: Unknown (7/3/2024)    Housing Stability Vital Sign     Unable to Pay for Housing in the Last Year: No       REVIEW OF SYSTEMS:  Constitution: Negative. Negative for chills, fever and night sweats.   Cardiovascular: Negative for chest pain and syncope.   Respiratory: Negative for cough and shortness of breath.   Gastrointestinal: See HPI. Negative for nausea/vomiting. Negative for abdominal pain.  Genitourinary: See HPI. Negative for discoloration or dysuria.  Skin: Negative for dry skin, itching and rash.   Hematologic/Lymphatic: Negative for bleeding problem. Does not bruise/bleed easily.   Musculoskeletal: see HPI.  Neurological: See HPI. No seizures.   Endocrine: Negative for polydipsia, polyphagia and polyuria.   Allergic/Immunologic: Negative for hives and persistent infections.    PHYSICAL EXAMINATION:    Ht 6' 2" (1.88 m)   Wt (!) 156.4 kg (344 lb 12.8 oz)   BMI 44.27 kg/m²     General: The patient is a 44 y.o. male in no apparent distress, the patient is oriented to person, place and time.   Psych: Normal mood and affect  HEENT:  " NCAT, sclera nonicteric  Lungs:  Respirations are equal and unlabored.  CV:  2+ bilateral upper and lower extremity pulses.  Skin:  Intact throughout.  Musculoskeletal: No pain with the range of motion of the bilateral hips. No trochanteric tenderness to palpation. No pain with range of motion about the bilateral knees.      Right Foot and Ankle Exam    INSPECTION:        Gait:    Normal   Scars:   None   Swelling:  None   Color:   Normal   Atrophy:  None    ALIGNMENT:    Hindfoot  Normal    Midfoot: Normal  Forefoot: Normal     Collective Ankle-Hindfoot Alignment    Good - plantigrade (PG), well aligned        TENDERNESS:  LATERAL:      Sinus tarsi:  None    Syndesmosis:  None     ATFL:   None      CFL:   None     Anterolateral gutter: None    Fibula:   None   Peroneal tendons: None     Peroneal tubercle.  None     ANTERIOR:  Anteromedial joint line:  None   Anterolateral joint line:  None  Talonavicular:    None  Anterior tibialis:   None   Extensor tendons:   None     POSTERIOR:  Medial/lateral achilles:  None   Medial/lateral achilles insertion: Moderate     MEDIAL:      Deltoid:  None     Malleolus:  None     PTT:   None     Navicular:  None            CALCANEUS:  Retrocalcaneal:   None   Medial achilles:   Moderate @ insertion  Lateral achilles:   Moderate @ insertion  Calcaneal tuberosity:   Moderate     FOOT:    Calcaneal cuboid  None    MT / MT heads:  None   Navicular   None     Medial cord origin PF:  None   Cuneiforms:   None     Web space:   None   Lisfranc    None     Tarsal tunnel:   None   Base of the fifth metatarsal  None    Tinels sign   Negative        RANGE OF MOTION:  RIGHT/ LEFT (* = pain)     Ankle DF/PF:  15/45  15/45         Eversion/Inversion: 15/25 15/25     Midfoot ABD/ADD: 10/10 10/10     First MTP DF/PF:  60/25 60/25               STRENGTH: (affected) (* = pain)  Anterior tibialis: 5/5  Posterior  tibialis: 5/5  Gastroc-soleus: 5/5  Peroneals:  5/5  EHL:   5/5  FHL:   5/5        SPECIAL TESTS:   ANKLE INSTABILITY: (*pain)    Anterior drawer:   Normal      (C-W contralateral side)     Inversion:   30°     Eversion  10°            Collective Instability: (Ant-post and varus-valgus)     Stable        PROVOCATIVE TESTING:    Forced DF/ER: No pain at syndesmosis.    Mid-leg squeeze  No pain at syndesmosis.    Forced DF:  No pain anterior joint line.      Forced PF:  No pain posterior ankle.     Forced INV:  No pain laterally.    Forced EV:  No pain medially.    Mcqueens sign: Normal ankle plantar flexion.     Resisted peroneal No subluxation or pain.      NEUROLOGIC TESTING:  All dermatomes foot, ankle and leg have normal sensation light touch  Ankle Reflexes 2+, symmetric     VASCULAR:  2+ pulses PT/DT with brisk capillary refill toes.          IMAGING:   Radiographs of the right ankle taken on 7/3/24 were personally reviewed with the patient today. Calcaneal enthesopathy at the Achilles insertion.           ASSESSMENT/PLAN:    Baudilio was seen today for pain.    Diagnoses and all orders for this visit:    Right Achilles tendinitis, insertion  -     Ambulatory referral/consult to Orthopedics        44 y.o. yo male presents with 7 month history of right achilles tendon pain at insertion that is improving, imaging reveals calcaneal enthesopathy at the Achilles insertion, likely has right achilles tendonitis at insertion.    Plan: The patient and I had a thorough discussion today.  We discussed the working diagnosis as well as several other potential alternative diagnoses.  Treatment options were discussed, both conservative and surgical.  Conservative treatment options would include things such as activity modifications, a period of rest, tylenol, anti-inflammatory medications, physical therapy, immobilization, corticosteroid injections, and others.     At this time, the patient would like to proceed with:  -  continuing calf stretches & exercises  - WBAT on RLE  - pain control: OTC NSAIDs/tylenol as needed for pain      Return to clinic in 6 weeks to evaluate progress.  - MRI if condition worsens    I have personally taken the history and examined this patient and agree with the residents note as stated above.        Carlene Sosa MD  Orthopaedic Surgery, PGY1  08/15/2024

## 2024-08-22 ENCOUNTER — OFFICE VISIT (OUTPATIENT)
Dept: UROLOGY | Facility: CLINIC | Age: 44
End: 2024-08-22
Payer: COMMERCIAL

## 2024-08-22 VITALS
DIASTOLIC BLOOD PRESSURE: 96 MMHG | HEIGHT: 74 IN | BODY MASS INDEX: 40.43 KG/M2 | WEIGHT: 315 LBS | SYSTOLIC BLOOD PRESSURE: 152 MMHG | HEART RATE: 74 BPM

## 2024-08-22 DIAGNOSIS — N42.81 PROSTADYNIA: Primary | ICD-10-CM

## 2024-08-22 DIAGNOSIS — N53.12 PAIN WITH EJACULATION: ICD-10-CM

## 2024-08-22 DIAGNOSIS — N52.9 ED (ERECTILE DYSFUNCTION) OF ORGANIC ORIGIN: ICD-10-CM

## 2024-08-22 DIAGNOSIS — R30.9 PAIN WITH URINATION: ICD-10-CM

## 2024-08-22 DIAGNOSIS — E66.01 CLASS 3 SEVERE OBESITY WITH SERIOUS COMORBIDITY AND BODY MASS INDEX (BMI) OF 40.0 TO 44.9 IN ADULT, UNSPECIFIED OBESITY TYPE: ICD-10-CM

## 2024-08-22 DIAGNOSIS — R39.9 LOWER URINARY TRACT SYMPTOMS (LUTS): ICD-10-CM

## 2024-08-22 LAB
BILIRUB SERPL-MCNC: NORMAL MG/DL
BLOOD URINE, POC: NORMAL
CLARITY, POC UA: CLEAR
COLOR, POC UA: YELLOW
GLUCOSE UR QL STRIP: NORMAL
KETONES UR QL STRIP: NORMAL
LEUKOCYTE ESTERASE URINE, POC: NORMAL
NITRITE, POC UA: NORMAL
PH, POC UA: 6
PROTEIN, POC: NORMAL
SPECIFIC GRAVITY, POC UA: 1.03
UROBILINOGEN, POC UA: 0.2

## 2024-08-22 PROCEDURE — 99999 PR PBB SHADOW E&M-EST. PATIENT-LVL III: CPT | Mod: PBBFAC,,, | Performed by: NURSE PRACTITIONER

## 2024-08-22 RX ORDER — NAPROXEN 500 MG/1
500 TABLET ORAL 2 TIMES DAILY WITH MEALS
Qty: 30 TABLET | Refills: 1 | Status: SHIPPED | OUTPATIENT
Start: 2024-08-22 | End: 2024-09-21

## 2024-08-22 RX ORDER — TADALAFIL 20 MG/1
20 TABLET ORAL DAILY PRN
Qty: 10 TABLET | Refills: 12 | Status: SHIPPED | OUTPATIENT
Start: 2024-08-22 | End: 2025-08-22

## 2024-08-22 NOTE — PROGRESS NOTES
CHIEF COMPLAINT:    Baudilio Potts is a 44 y.o. male presents today for LUTS    HISTORY OF PRESENTING ILLINESS:    Baudilio Potts is a 44 y.o. male new to our urology Clinic. This is a new patient to for me. I personally reviewed their recent medical records as well as their outside medical, surgical, family, & social history.     He is here today due to having some discomfort with urination. States that when he urinates he felt a discomfort in penis/pelvic area. Discomfort was hard to explain; even tell location. Similar discomfort occurred with ejaculation.   Notes that this has been improving since reporting.     FOS has been normal. No straining. Nocturia 1x. No gross hematuria. No leaking.  Erections are not like they use to be. Functional but would like firmer. Denies any hematospermia.   Tried Viagra in the past. Worked well but had some nasal congestion.    He is on TRT from outside facility      REVIEW OF SYSTEMS:  Review of Systems   Constitutional: Negative.  Negative for chills and fever.   Eyes:  Negative for double vision.   Respiratory:  Negative for cough and shortness of breath.    Cardiovascular:  Negative for chest pain and palpitations.   Gastrointestinal:  Negative for abdominal pain, constipation, diarrhea, nausea and vomiting.   Genitourinary:  Negative for frequency, hematuria and urgency.        See HPI   Musculoskeletal:  Negative for falls.   Neurological:  Negative for dizziness and seizures.   Endo/Heme/Allergies:  Negative for polydipsia.         PATIENT HISTORY:    Past Medical History:   Diagnosis Date    Hypertension        Past Surgical History:   Procedure Laterality Date    WISDOM TOOTH EXTRACTION         Family History   Problem Relation Name Age of Onset    Pacemaker/defibrilator Mother Queta Gauthier     Hypertension Mother Queta Gauthier     Heart failure Mother Queta Gauthier     Breast cancer Sister Bertrand Potts 43    Depression Sister Bertrand Potts      Diabetes Sister Bertrand Potts     Heart disease Sister Bertrand Potts     Hypertension Sister Bertrand Potts     No Known Problems Sister      Colon cancer Neg Hx      Prostate cancer Neg Hx         Social History     Socioeconomic History    Marital status:    Tobacco Use    Smoking status: Never    Smokeless tobacco: Never   Substance and Sexual Activity    Alcohol use: Yes     Alcohol/week: 1.0 standard drink of alcohol     Types: 1 Shots of liquor per week     Comment: social    Drug use: Never    Sexual activity: Yes     Partners: Male     Birth control/protection: Other-see comments     Comment: Pre exposure prophylaxis     Social Determinants of Health     Financial Resource Strain: Low Risk  (7/3/2024)    Overall Financial Resource Strain (CARDIA)     Difficulty of Paying Living Expenses: Not very hard   Food Insecurity: No Food Insecurity (7/3/2024)    Hunger Vital Sign     Worried About Running Out of Food in the Last Year: Never true     Ran Out of Food in the Last Year: Never true   Transportation Needs: No Transportation Needs (6/22/2022)    [facility]    PRAPARE - Transportation     Lack of Transportation (Medical): No     Lack of Transportation (Non-Medical): No   Physical Activity: Unknown (7/3/2024)    Exercise Vital Sign     Days of Exercise per Week: 0 days   Stress: Stress Concern Present (7/3/2024)    [employer] [place] [employer] - Occupational Stress Questionnaire     Feeling of Stress : Rather much   Housing Stability: Unknown (7/3/2024)    Housing Stability Vital Sign     Unable to Pay for Housing in the Last Year: No       Allergies:  Shellfish containing products    Medications:    Current Outpatient Medications:     amlodipine (NORVASC) 10 MG tablet, TAKE 1 TABLET BY MOUTH EVERY DAY, Disp: 90 tablet, Rfl: 1    anastrozole (ARIMIDEX) 1 mg Tab, Take 1 mg by mouth every 7 days., Disp: , Rfl:     blood pressure monitor Kit, Use daily to  "check blood pressure as directed., Disp: 1 each, Rfl: 0    citalopram (CELEXA) 40 MG tablet, Take 1 tablet (40 mg total) by mouth once daily., Disp: 90 tablet, Rfl: 3    emtricitabine-tenofovir 200-300 mg (TRUVADA) 200-300 mg Tab, Take 1 tablet by mouth once daily., Disp: , Rfl:     tirzepatide 7.5 mg/0.5 mL PnIj, , Disp: , Rfl:     vitamin D (VITAMIN D3) 1000 units Tab, Take 1,000 Units by mouth once daily., Disp: , Rfl:     naproxen (NAPROSYN) 500 MG tablet, Take 1 tablet (500 mg total) by mouth 2 (two) times daily with meals., Disp: 30 tablet, Rfl: 1    tadalafiL (CIALIS) 20 MG Tab, Take 1 tablet (20 mg total) by mouth daily as needed (take 30-60 minutes before on an empty stomach)., Disp: 10 tablet, Rfl: 12    PHYSICAL EXAMINATION:  Physical Exam  Vitals and nursing note reviewed.   Constitutional:       General: He is awake.      Appearance: Normal appearance.   HENT:      Head: Normocephalic.      Right Ear: External ear normal.      Left Ear: External ear normal.      Nose: Nose normal.   Cardiovascular:      Rate and Rhythm: Normal rate.   Pulmonary:      Effort: Pulmonary effort is normal. No respiratory distress.   Abdominal:      Tenderness: There is no abdominal tenderness. There is no right CVA tenderness or left CVA tenderness.   Genitourinary:     Penis: Normal and circumcised. No hypospadias.       Testes: Normal.      Prostate: Not tender and no nodules present. Enlarged: ~25gms.     Rectum: Normal.   Musculoskeletal:         General: Normal range of motion.      Cervical back: Normal range of motion.   Skin:     General: Skin is warm and dry.   Neurological:      General: No focal deficit present.      Mental Status: He is alert and oriented to person, place, and time.   Psychiatric:         Mood and Affect: Mood normal.         Behavior: Behavior is cooperative.           LABS:      In office UA today was clear of active infection and blood.         No results found for: "PSA", "PSADIAG", " ""PSATOTAL", "PHIND"    Lab Results   Component Value Date    CREATININE 1.3 07/03/2024    EGFRNORACEVR >60 07/03/2024               IMPRESSION:    Encounter Diagnoses   Name Primary?    Prostadynia Yes    ED (erectile dysfunction) of organic origin     Lower urinary tract symptoms (LUTS)     Pain with ejaculation     Pain with urination          Assessment:       1. Prostadynia    2. ED (erectile dysfunction) of organic origin    3. Lower urinary tract symptoms (LUTS)    4. Pain with ejaculation    5. Pain with urination        Plan:         I spent 45 minutes with the patient of which more than half was spent in direct consultation with the patient in regards to our treatment and plan.  We addressed the office findings and recent labs; any possible need to go the ER today.   Education and recommendations of today's plan of care including home remedies and needed follow up with PCP.   We discussed the chief complaints; reviewed the LUTS & ED and the possible contributory factors.   Reassurance no infection or visible blood seen in today's urine sample  Reviewed management; including possible medical options.   Recommended lifestyle modifications with a proper, healthy diet, good hydration but during the day. Reducing bladder irritants.   Benefits of regular exercise and weight loss.   Rx for Naprosyn 500mg BID x 2 weeks for discomfort.  Rx for Cialis 20mg PRN; take on empty stomach 30-60 minutes before. We reviewed new meds; discussed the reason, benefits, expectations as well as risks, possible side effects.   Any concerns then stop taking and let me know.  F/u via of portal.        "

## 2024-09-11 ENCOUNTER — TELEPHONE (OUTPATIENT)
Dept: INTERNAL MEDICINE | Facility: CLINIC | Age: 44
End: 2024-09-11
Payer: COMMERCIAL

## 2024-09-25 ENCOUNTER — OFFICE VISIT (OUTPATIENT)
Dept: INTERNAL MEDICINE | Facility: CLINIC | Age: 44
End: 2024-09-25
Payer: COMMERCIAL

## 2024-09-25 DIAGNOSIS — S86.001S INJURY OF RIGHT ACHILLES TENDON, SEQUELA: ICD-10-CM

## 2024-09-25 DIAGNOSIS — R73.03 PREDIABETES: ICD-10-CM

## 2024-09-25 DIAGNOSIS — E55.9 VITAMIN D DEFICIENCY: ICD-10-CM

## 2024-09-25 DIAGNOSIS — I10 ESSENTIAL HYPERTENSION: Primary | ICD-10-CM

## 2024-09-25 DIAGNOSIS — F41.1 GENERALIZED ANXIETY DISORDER: ICD-10-CM

## 2024-09-25 DIAGNOSIS — E66.01 CLASS 3 SEVERE OBESITY DUE TO EXCESS CALORIES WITH SERIOUS COMORBIDITY AND BODY MASS INDEX (BMI) OF 45.0 TO 49.9 IN ADULT: ICD-10-CM

## 2024-09-25 PROCEDURE — 3061F NEG MICROALBUMINURIA REV: CPT | Mod: CPTII,95,, | Performed by: STUDENT IN AN ORGANIZED HEALTH CARE EDUCATION/TRAINING PROGRAM

## 2024-09-25 PROCEDURE — 99213 OFFICE O/P EST LOW 20 MIN: CPT | Mod: 95,,, | Performed by: STUDENT IN AN ORGANIZED HEALTH CARE EDUCATION/TRAINING PROGRAM

## 2024-09-25 PROCEDURE — 3066F NEPHROPATHY DOC TX: CPT | Mod: CPTII,95,, | Performed by: STUDENT IN AN ORGANIZED HEALTH CARE EDUCATION/TRAINING PROGRAM

## 2024-09-25 PROCEDURE — 3044F HG A1C LEVEL LT 7.0%: CPT | Mod: CPTII,95,, | Performed by: STUDENT IN AN ORGANIZED HEALTH CARE EDUCATION/TRAINING PROGRAM

## 2024-09-25 RX ORDER — HYDROCHLOROTHIAZIDE 12.5 MG/1
12.5 TABLET ORAL DAILY
Qty: 90 TABLET | Refills: 0 | Status: SHIPPED | OUTPATIENT
Start: 2024-09-25

## 2024-09-25 NOTE — PROGRESS NOTES
The patient's location is:  [place]  The chief complaint leading to consultation is:  Essential hypertension [I10]    Visit type: audiovisual    Time spent in discussion with the patient: 12 minutes  18 minutes of total time spent on the encounter. This includes time spent in discussion with the patient and time preparing for the patient appointment: review of tests, obtaining and/or reviewing separately obtained history, documenting clinical information in the electronic or other health record, independently interpreting results (not separately reported), and communicating results to the patient/family/caregiver or care coordination (not separately reported).     Each patient to whom he or she provides medical services by telemedicine is: (1) informed of the relationship between the physician and patient and the respective role of any other health care provider with respect to management of the patient; and (2) notified that he or she may decline to receive medical services by telemedicine and may withdraw from such care at any time.      Subjective:   Baudilio Potts is a 44 y.o. male who presents for Essential hypertension [I10].       Patient is established with me, here today for the following:     HTN, prediabetes, anxiety, LALITO, elevated ASCVD risk, ED, vitamin D deficiency    Due for COVID, influenza vaccinations.    Prediabetes -   UTD on diabetes screening.  Lab Results       Component                Value               Date                       HGBA1C                   5.0                 07/03/2024                 HGBA1C                   5.2                 12/05/2022                 HGBA1C                   5.7 (H)             12/22/2020               Taking Zepbound 7.5 mg for weight reduction with good effect            Started medication around end of MAY2024     HTN -   Currently prescribed amlodipine.  Previously on HCTZ, believes stopped when no longer requiring two medications.  Patient  endorses taking medication as directed.  Denies side effects or concerns while taking medication.  TSH WNL, renin aldosterone ratio WNL  Patient endorses home -150's   Lab Results       Component                Value               Date                       MICALBCREAT              11.1                07/03/2024            BP Readings from Last 5 Encounters:  08/22/24 : (!) 152/96  07/03/24 : (!) 138/92  02/01/24 : 132/86  11/14/23 : (!) 134/90  10/04/23 : 137/89    Symptoms improved with increased dose Celexa  Feels anxiety well controlled at current dose      Vitamin D deficiency -  Taking vitamin D3 1000 IU supplementation daily.   Lab Results       Component                Value               Date                       [id number]             7 (L)               07/03/2024                 [id number]             6 (L)               12/05/2022                     Anemia -  Resolved  Lab Results       Component                Value               Date                       HGB                      14.2                07/03/2024                 HCT                      43.3                07/03/2024                 MCV                      94                  07/03/2024                 RDW                      12.9                07/03/2024            Lab Results       Component                Value               Date                       IRON                     94                  07/03/2024                 TRANSFERRIN              244                 07/03/2024                 TIBC                     361                 07/03/2024                 FESATURATED              26                  07/03/2024             Lab Results       Component                Value               Date                       FERRITIN                 221                 07/03/2024            Lab Results       Component                Value               Date                       [id number]               459                  07/03/2024            Lab Results       Component                Value               Date                       FOLATE                   8.2                 07/03/2024                      Right achilles pain improving, but still with discomfort and loss of function  Has been wearing splint as directed by orthopedist  Has follow up with Dr. Lopez for orthopedics 10OCT2024            Review of Systems   Constitutional:  Negative for activity change and unexpected weight change.   HENT:  Negative for hearing loss, rhinorrhea and trouble swallowing.    Eyes:  Negative for discharge and visual disturbance.   Respiratory:  Negative for chest tightness and wheezing.    Cardiovascular:  Negative for chest pain and palpitations.   Gastrointestinal:  Negative for blood in stool, constipation, diarrhea and vomiting.   Endocrine: Negative for polydipsia and polyuria.   Genitourinary:  Negative for difficulty urinating, hematuria and urgency.   Musculoskeletal:  Negative for arthralgias, joint swelling and neck pain.   Neurological:  Negative for weakness and headaches.   Psychiatric/Behavioral:  Negative for confusion and dysphoric mood.          Current Outpatient Medications   Medication Instructions    amlodipine (NORVASC) 10 MG tablet TAKE 1 TABLET BY MOUTH EVERY DAY    anastrozole (ARIMIDEX) 1 mg, Oral, Every 7 days    blood pressure monitor Kit Use daily to check blood pressure as directed.    citalopram (CELEXA) 40 mg, Oral, Daily    emtricitabine-tenofovir 200-300 mg (TRUVADA) 200-300 mg Tab 1 tablet, Oral, Daily    hydroCHLOROthiazide (HYDRODIURIL) 12.5 mg, Oral, Daily    naproxen (NAPROSYN) 500 mg, Oral, 2 times daily with meals    tadalafiL (CIALIS) 20 mg, Oral, Daily PRN    tirzepatide 7.5 mg/0.5 mL PnIj     vitamin D (VITAMIN D3) 1,000 Units, Oral, Daily       Objective:   There were no vitals filed for this visit.     Physical Exam  Constitutional:       General: He is not in acute distress.     Appearance:  Normal appearance. He is not ill-appearing or diaphoretic.   Neurological:      Mental Status: He is alert.   Psychiatric:         Attention and Perception: Attention normal.         Mood and Affect: Mood and affect normal.         Speech: Speech normal.           Assessment/Plan:       Essential hypertension  Continue amlodipine  Start HCTZ  Check BP at home 3-4 times weekly, keep log for review.   Contact office with home blood pressure logs in 2-3 weeks.   RTC in 4 months for follow up.  -     hydroCHLOROthiazide (HYDRODIURIL) 12.5 MG Tab; Take 1 tablet (12.5 mg total) by mouth once daily.  -     Basic Metabolic Panel; Future    Prediabetes  Continue healthy diet and lifestyle modifications  RTC in 4 months for follow up.    Class 3 severe obesity due to excess calories with serious comorbidity and body mass index (BMI) of 45.0 to 49.9 in adult  Continue current medications.  RTC in 4 months for follow up.    Generalized anxiety disorder  Continue current medications.  RTC in 4 months for follow up.    Vitamin D deficiency  Increase vitamin D to 3000 IU daily  RTC in 4 months for follow up.    Injury of right Achilles tendon, sequela  Continue evaluation and management per orthopedist.        Tammy Rivera MD  09/25/2024

## 2024-10-08 ENCOUNTER — CLINICAL SUPPORT (OUTPATIENT)
Dept: OTHER | Facility: CLINIC | Age: 44
End: 2024-10-08
Payer: COMMERCIAL

## 2024-10-08 DIAGNOSIS — Z00.8 ENCOUNTER FOR OTHER GENERAL EXAMINATION: ICD-10-CM

## 2024-10-09 VITALS
HEIGHT: 74 IN | DIASTOLIC BLOOD PRESSURE: 84 MMHG | SYSTOLIC BLOOD PRESSURE: 130 MMHG | BODY MASS INDEX: 40.43 KG/M2 | WEIGHT: 315 LBS

## 2024-10-09 LAB
GLUCOSE SERPL-MCNC: 111 MG/DL (ref 60–140)
HDLC SERPL-MCNC: 27 MG/DL
POC CHOLESTEROL, LDL (DOCK): 101 MG/DL
POC CHOLESTEROL, TOTAL: 146 MG/DL
TRIGL SERPL-MCNC: 93 MG/DL

## 2024-10-16 ENCOUNTER — PATIENT MESSAGE (OUTPATIENT)
Dept: INTERNAL MEDICINE | Facility: CLINIC | Age: 44
End: 2024-10-16
Payer: COMMERCIAL

## 2024-11-12 ENCOUNTER — OFFICE VISIT (OUTPATIENT)
Dept: ORTHOPEDICS | Facility: CLINIC | Age: 44
End: 2024-11-12
Payer: COMMERCIAL

## 2024-11-12 VITALS — BODY MASS INDEX: 40.43 KG/M2 | WEIGHT: 315 LBS | HEIGHT: 74 IN

## 2024-11-12 DIAGNOSIS — M76.61 RIGHT ACHILLES TENDINITIS: Primary | ICD-10-CM

## 2024-11-12 PROCEDURE — 1160F RVW MEDS BY RX/DR IN RCRD: CPT | Mod: CPTII,S$GLB,, | Performed by: ORTHOPAEDIC SURGERY

## 2024-11-12 PROCEDURE — 3008F BODY MASS INDEX DOCD: CPT | Mod: CPTII,S$GLB,, | Performed by: ORTHOPAEDIC SURGERY

## 2024-11-12 PROCEDURE — 3061F NEG MICROALBUMINURIA REV: CPT | Mod: CPTII,S$GLB,, | Performed by: ORTHOPAEDIC SURGERY

## 2024-11-12 PROCEDURE — 99999 PR PBB SHADOW E&M-EST. PATIENT-LVL III: CPT | Mod: PBBFAC,,, | Performed by: ORTHOPAEDIC SURGERY

## 2024-11-12 PROCEDURE — 3044F HG A1C LEVEL LT 7.0%: CPT | Mod: CPTII,S$GLB,, | Performed by: ORTHOPAEDIC SURGERY

## 2024-11-12 PROCEDURE — 3066F NEPHROPATHY DOC TX: CPT | Mod: CPTII,S$GLB,, | Performed by: ORTHOPAEDIC SURGERY

## 2024-11-12 PROCEDURE — 99213 OFFICE O/P EST LOW 20 MIN: CPT | Mod: S$GLB,,, | Performed by: ORTHOPAEDIC SURGERY

## 2024-11-12 PROCEDURE — 1159F MED LIST DOCD IN RCRD: CPT | Mod: CPTII,S$GLB,, | Performed by: ORTHOPAEDIC SURGERY

## 2024-11-12 RX ORDER — METHYLPREDNISOLONE 4 MG/1
TABLET ORAL
Qty: 1 EACH | Refills: 0 | Status: SHIPPED | OUTPATIENT
Start: 2024-11-12

## 2024-11-12 NOTE — PROGRESS NOTES
Baudilio Potts  Checked in 20 minutes late for appointment    Returns today for follow-up.  This is a 44-year-old male who presented to me on 08/15/2024 with a six-month history of right heel pain that began in February when he was doing an intense high impact workout.  I suspected that he had insertional Achilles tendinitis of his right heel and recommended continued conservative measures.  He returns today and reports     Examination:    Impression:     Recommendation:

## 2024-11-13 ENCOUNTER — PATIENT MESSAGE (OUTPATIENT)
Dept: ADMINISTRATIVE | Facility: HOSPITAL | Age: 44
End: 2024-11-13
Payer: COMMERCIAL

## 2024-12-10 ENCOUNTER — PATIENT MESSAGE (OUTPATIENT)
Dept: INTERNAL MEDICINE | Facility: CLINIC | Age: 44
End: 2024-12-10
Payer: COMMERCIAL

## 2024-12-10 ENCOUNTER — OFFICE VISIT (OUTPATIENT)
Dept: ORTHOPEDICS | Facility: CLINIC | Age: 44
End: 2024-12-10
Payer: COMMERCIAL

## 2024-12-10 VITALS — BODY MASS INDEX: 40.43 KG/M2 | HEIGHT: 74 IN | WEIGHT: 315 LBS

## 2024-12-10 DIAGNOSIS — M76.61 RIGHT ACHILLES TENDINITIS: Primary | ICD-10-CM

## 2024-12-10 PROCEDURE — 3061F NEG MICROALBUMINURIA REV: CPT | Mod: CPTII,S$GLB,, | Performed by: ORTHOPAEDIC SURGERY

## 2024-12-10 PROCEDURE — 1159F MED LIST DOCD IN RCRD: CPT | Mod: CPTII,S$GLB,, | Performed by: ORTHOPAEDIC SURGERY

## 2024-12-10 PROCEDURE — 99999 PR PBB SHADOW E&M-EST. PATIENT-LVL III: CPT | Mod: PBBFAC,,, | Performed by: ORTHOPAEDIC SURGERY

## 2024-12-10 PROCEDURE — 3066F NEPHROPATHY DOC TX: CPT | Mod: CPTII,S$GLB,, | Performed by: ORTHOPAEDIC SURGERY

## 2024-12-10 PROCEDURE — 3008F BODY MASS INDEX DOCD: CPT | Mod: CPTII,S$GLB,, | Performed by: ORTHOPAEDIC SURGERY

## 2024-12-10 PROCEDURE — 3044F HG A1C LEVEL LT 7.0%: CPT | Mod: CPTII,S$GLB,, | Performed by: ORTHOPAEDIC SURGERY

## 2024-12-10 PROCEDURE — 1160F RVW MEDS BY RX/DR IN RCRD: CPT | Mod: CPTII,S$GLB,, | Performed by: ORTHOPAEDIC SURGERY

## 2024-12-10 PROCEDURE — 99213 OFFICE O/P EST LOW 20 MIN: CPT | Mod: S$GLB,,, | Performed by: ORTHOPAEDIC SURGERY

## 2024-12-10 NOTE — PROGRESS NOTES
Baudilio Potts  Returns today for follow-up.  This is a 44-year-old male who presented to me initially in August with a six-month history of right heel pain that began when he was doing some high impact exercise and had a popping sensation.  When I evaluated him I suspected he had some insertional Achilles tendinitis.  His last visit was on 11/12/2024 and I recommended a four week course of boot immobile.  He returns today and reports that his symptoms are significantly improved.    Examination: There is no significant swelling about his right heel today.  He has mild tenderness over the insertion of the Achilles tendon which is improved from previous.    Impression:  1. Right Achilles tendinitis, insertion  Ambulatory referral/consult to Physical/Occupational Therapy        Recommendation:  It is encouraging that he is doing.  I would like him to start weaning out of the boot.  I am going to have him start physical therapy.      Follow-up in eight weeks if necessary

## 2024-12-12 ENCOUNTER — CLINICAL SUPPORT (OUTPATIENT)
Dept: REHABILITATION | Facility: HOSPITAL | Age: 44
End: 2024-12-12
Attending: ORTHOPAEDIC SURGERY
Payer: COMMERCIAL

## 2024-12-12 DIAGNOSIS — M79.671 HEEL PAIN, CHRONIC, RIGHT: ICD-10-CM

## 2024-12-12 DIAGNOSIS — M76.61 RIGHT ACHILLES TENDINITIS: ICD-10-CM

## 2024-12-12 DIAGNOSIS — M25.671 DECREASED RANGE OF MOTION OF RIGHT ANKLE: Primary | ICD-10-CM

## 2024-12-12 DIAGNOSIS — G89.29 HEEL PAIN, CHRONIC, RIGHT: ICD-10-CM

## 2024-12-12 PROCEDURE — 97161 PT EVAL LOW COMPLEX 20 MIN: CPT

## 2024-12-12 PROCEDURE — 97140 MANUAL THERAPY 1/> REGIONS: CPT

## 2024-12-12 PROCEDURE — 97112 NEUROMUSCULAR REEDUCATION: CPT

## 2024-12-12 NOTE — PROGRESS NOTES
"[facility]   Physical Therapy Initial Evaluation      Name: Baudilio Potts  Clinic Number: [id number]    Therapy Diagnosis:   Encounter Diagnoses   Name Primary?    Right Achilles tendinitis, insertion     Decreased range of motion of right ankle Yes    Heel pain, chronic, right         Physician: Erickson Lopez MD    Physician Orders: PT Eval and Treat   Medical Diagnosis from Referral: M76.61 (ICD-10-CM) - Right Achilles tendinitis    Evaluation Date: 12/12/2024  Authorization Period Expiration: 12/10/2025  Plan of Care Expiration: 04/03/2025  Progress Note Due: 1/12/2024  Visit # / Visits authorized: 1/ 1   FOTO: 1/3    Precautions: Standard,     Time In: 9:01 am   Time Out: ***  Total Appointment Time (timed & untimed codes): *** minutes    Subjective     Date of onset: February 2025    History of current condition - Baudilio reports: ***    Falls: No    Imaging: X-Ray: 07/03/2024  FINDINGS:  No evidence of a displaced fracture or osseous destructive process.     No apparent dislocation.     No advanced degenerative change or joint space narrowing of the tibiotalar joint.     Calcaneal enthesopathy at the Achilles insertion.     Mild soft tissue swelling about the ankle most pronounced laterally.     No radiopaque foreign body.     Impression:     As above  Prior Therapy: never for this condition  Social History: lives with their family  Occupation: ***  Prior Level of Function: ***  Current Level of Function: ***    Pain:  Current {0-10:72318::"0"}/10, worst {0-10:20507::"0"}/10, best {0-10:20507::"0"}/10   Location: {RIGHT LEFT BILATERAL:67387} {LOCATION ON BODY:08301}   Description: {Pain Description:69961}  Aggravating Factors: {Causes; Pain:36985}  Easing Factors: {Pain (activities that relieve):76576}    Patients goals: ***     Medical History:   Past Medical History:   Diagnosis Date    Hypertension        Surgical History:   Baudilio Potts  has a past surgical history " that includes [place] tooth extraction.    Medications:   Baudilio has a current medication list which includes the following prescription(s): amlodipine, anastrozole, blood pressure monitor, citalopram, emtricitabine-tenofovir 200-300 mg, hydrochlorothiazide, methylprednisolone, tadalafil, tirzepatide, and vitamin d.    Allergies:   Review of patient's allergies indicates:   Allergen Reactions    Shellfish containing products Nausea And Vomiting and Shortness Of Breath        Objective      Observation: 45 y/o male alert and oriented     Posture: ***    Active Range of Motion:   Ankle Right Left   DF (knee extended) *** ***   Plantarflexion *** ***   Inversion *** ***   Eversion *** ***      Strength:  Ankle Right Left   Dorsiflexion *** ***   Plantarflexion *** ***   Inversion *** ***   Eversion *** ***     Special Tests:  Anterior Drawer ***   Talar tilt ***   Squeeze test ***   Mcqueen ***     ***    Joint Mobility: ***    Palpation: ***    Sensation: ***    Functional Tests:   SLS EO: ***  SLS EC: ***  Double leg squat: ***  Single leg squat: ***    Girth Measurement Fig-8   Right *** cm   Left *** cm        Intake Outcome Measure for FOTO *** Survey    Therapist reviewed FOTO scores for Baudilio Potts on 12/12/2024.   FOTO documents entered into [employer] - see Media section.    Intake Score: ***%         Treatment     Total Treatment time (time-based codes) separate from Evaluation: *** minutes     Baudilio received the treatments listed below:      therapeutic exercises to develop ROM, strength, flexibility, endurance for *** minutes including:  ***    manual therapy techniques: joint mobilizations and/or soft tissue mobilizations were applied to the: *** for *** minutes, including:  ***    neuromuscular re-education activities to improve: motor control, balance, muscle activation, proprioception, posture for *** minutes. The following activities were included:  ***    therapeutic activities to improve functional  performance for ***  minutes, including:  ***    gait training to improve functional mobility and safety for ***  minutes, including:  ***      Patient Education and Home Exercises     Education provided:   - Diagnosis and prognosis    Written Home Exercises Provided: yes. Exercises were reviewed and Baudilio was able to demonstrate them prior to the end of the session.  Baudilio demonstrated good  understanding of the education provided. See EMR under Patient Instructions for exercises provided during therapy sessions.    Assessment     Baudilio is a 44 y.o. male referred to outpatient Physical Therapy with a medical diagnosis of M76.61 (ICD-10-CM) - Right Achilles tendinitis. Patient presents with decreased R ankle ROM, increased R heel pain, decreased functional activities without pain, decreased R ankle strength, decreased R foot intrinsics strength, and decreased R ankle/foot mobility. ***    Patient prognosis is Good.   Patient will benefit from skilled outpatient Physical Therapy to address the deficits stated above and in the chart below, provide patient /family education, and to maximize patientt's level of independence.     Plan of care discussed with patient: Yes  Patient's spiritual, cultural and educational needs considered and patient is agreeable to the plan of care and goals as stated below:     Anticipated Barriers for therapy: work    Medical Necessity is demonstrated by the following  History  Co-morbidities and personal factors that may impact the plan of care [x] LOW: no personal factors / co-morbidities  [] MODERATE: 1-2 personal factors / co-morbidities  [] HIGH: 3+ personal factors / co-morbidities    Moderate / High Support Documentation:   Co-morbidities affecting plan of care:     Personal Factors:        Examination  Body Structures and Functions, activity limitations and participation restrictions that may impact the plan of care [x] LOW: addressing 1-2 elements  [] MODERATE: 3+  elements  [] HIGH: 4+ elements (please support below)    Moderate / High Support Documentation:      Clinical Presentation [x] LOW: stable  [] MODERATE: Evolving  [] HIGH: Unstable     Decision Making/ Complexity Score: low       GOALS:   Short Term Goals:  8 weeks  1.Report decreased *** pain  < / =  ***/10  to increase tolerance for ***  2. Increase ROM by *** degrees in order to walk with min to no compensation.  3. Increase strength by 1/3 MMT grade for  ***  to increase tolerance for ADL and work activities.  4. Pt to tolerate HEP to improve ROM and independence with ADL's    Long Term Goals: 16 weeks  1.Report decreased *** pain  < / =  ***/10  to increase tolerance for ***  2.Patient goal: ***  3.Increase strength to 4+/5 for  ***  to increase tolerance for ADL and work activities.  4. Pt will report at CJ level (20-40% impaired) on Modified FIM score for mobility to demonstrate increase in LE function and mobility in home and community environment.      Plan     Plan of care Certification: 12/12/2024 to 04/03/2025.    Outpatient Physical Therapy 1-2 times weekly for 16 weeks to include the following interventions: manual therapy, therapeutic exercise, therapeutic activities, and neuromuscular re-education.    Isaías Ramos, PT, DPT

## 2024-12-12 NOTE — PLAN OF CARE
[facility]   Physical Therapy Initial Evaluation      Name: Baudilio Potts  Clinic Number: [id number]    Therapy Diagnosis:   Encounter Diagnoses   Name Primary?    Right Achilles tendinitis, insertion     Decreased range of motion of right ankle Yes    Heel pain, chronic, right         Physician: Erickson Lopez MD    Physician Orders: PT Eval and Treat   Medical Diagnosis from Referral: M76.61 (ICD-10-CM) - Right Achilles tendinitis    Evaluation Date: 12/12/2024  Authorization Period Expiration: 12/10/2025  Plan of Care Expiration: 04/03/2025  Progress Note Due: 1/12/2024  Visit # / Visits authorized: 1/ 1   FOTO: 1/3    Precautions: Standard,     Time In: 9:01 am   Time Out: 10:00 am   Total Appointment Time (timed & untimed codes): 59 minutes    Subjective     Date of onset: February 2025    History of current condition - Baudilio reports: he has had R posterior heel pain since February after working with a  and doing calf exercises. Pt reports he had been working out for about six months with a , but he only completed calf exercises a couple times. Pt reported he has had pain off and on since then before going to a physician again about a month ago who put him in a boot and gave him a steroid series. He reports the pain went away but he got the boot off on Tuesday and the pain has already returned. Pt reports the physician gave him some stretching exercises as well which have not helped much. Pt has pain every morning when he wakes up which loosens up throughout the day. He also reports pain when he stands after prolonged sitting. Denies numbness and tingling or taking antibiotics before this occurred.     Falls: No    Imaging: X-Ray: 07/03/2024  FINDINGS:  No evidence of a displaced fracture or osseous destructive process.     No apparent dislocation.     No advanced degenerative change or joint space narrowing of the tibiotalar joint.     Calcaneal enthesopathy at  the Achilles insertion.     Mild soft tissue swelling about the ankle most pronounced laterally.     No radiopaque foreign body.     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    Medications:   Baudilio has a current medication list which includes the following prescription(s): amlodipine, anastrozole, blood pressure monitor, citalopram, emtricitabine-tenofovir 200-300 mg, hydrochlorothiazide, methylprednisolone, tadalafil, tirzepatide, and vitamin d.    Allergies:   Review of patient's allergies indicates:   Allergen Reactions    Shellfish containing products Nausea And Vomiting and Shortness Of Breath        Objective      Observation: 45 y/o male alert and oriented     Posture: inversion of L heel, B pes planus, R achilles thinner than L     Active Range of Motion:   Ankle Right Left   DF (knee extended) -10 -5   Plantarflexion 60 70   Inversion 15 30   Eversion 25 20      Strength:  Ankle Right Left   Dorsiflexion 4-/5 4/5   Plantarflexion 3/5 4/5   Inversion 3/5 4/5   Eversion 4/5 5/5     Special Tests:  Achilles Compression +   Squeeze test -   Richar -       Joint Mobility: limited AP glides on R ankle; subtalar decreased lateral glides bilateral    Palpation: TTP to achilles insertion    Sensation: intact    Functional Tests:   SLS EO: R: 22 sec.  L: 10  "sec  SLS EC: unable   Double leg squat: Flattens arch, everts heels, knees over toes   Single leg squat: R: heel eversion, L: Knee valgus     Intake Outcome Measure for FOTO Foot Survey    Therapist reviewed FOTO scores for Baudilio Potts on 12/12/2024.   FOTO documents entered into [employer] - see Media section.    Intake Score: 55%         Treatment     Total Treatment time (time-based codes) separate from Evaluation: 18 minutes     Baudilio received the treatments listed below:      manual therapy techniques: joint mobilizations and/or soft tissue mobilizations were applied to the: R ankle/foot for 8 minutes, including:  TC distraction manipulation> pt agreeable   AP TC glides gr III-IV  Subtalar eversion gr III-IV    neuromuscular re-education activities to improve: motor control, balance, muscle activation, proprioception, posture for 10 minutes. The following activities were included:  Ankle PF isometrics 45" holds, 2 minute breaks 3x   Heel slides 20x   Arch lifts 20x 5" holds   Towel scrunches 2 minutes       Patient Education and Home Exercises     Education provided:   - Diagnosis and prognosis    Written Home Exercises Provided: yes. Exercises were reviewed and Baudilio was able to demonstrate them prior to the end of the session.  Baudilio demonstrated good  understanding of the education provided. See EMR under Patient Instructions for exercises provided during therapy sessions.    Assessment     Baudilio is a 44 y.o. male referred to outpatient Physical Therapy with a medical diagnosis of M76.61 (ICD-10-CM) - Right Achilles tendinitis. Patient presents with decreased R ankle ROM, increased R heel pain, decreased functional activities without pain, decreased R ankle strength, decreased R foot intrinsics strength, decreased balance and decreased R ankle/foot mobility. Pt signs and symptoms are most consistent with insertional achilles tendinopathy displayed by objective measures and subjective reports. "     Patient prognosis is Good.   Patient will benefit from skilled outpatient Physical Therapy to address the deficits stated above and in the chart below, provide patient /family education, and to maximize patientt's level of independence.     Plan of care discussed with patient: Yes  Patient's spiritual, cultural and educational needs considered and patient is agreeable to the plan of care and goals as stated below:     Anticipated Barriers for therapy: work    Medical Necessity is demonstrated by the following  History  Co-morbidities and personal factors that may impact the plan of care [x] LOW: no personal factors / co-morbidities  [] MODERATE: 1-2 personal factors / co-morbidities  [] HIGH: 3+ personal factors / co-morbidities    Moderate / High Support Documentation:   Co-morbidities affecting plan of care:     Personal Factors:        Examination  Body Structures and Functions, activity limitations and participation restrictions that may impact the plan of care [x] LOW: addressing 1-2 elements  [] MODERATE: 3+ elements  [] HIGH: 4+ elements (please support below)    Moderate / High Support Documentation:      Clinical Presentation [x] LOW: stable  [] MODERATE: Evolving  [] HIGH: Unstable     Decision Making/ Complexity Score: low       GOALS:   Short Term Goals:  8 weeks  1.Report decreased R foot/ankle pain  < / =  2/10  to increase tolerance for walking  2. Increase ROM by 10 degrees in order to R foot/ankle to increase tolerance for ADL and work activities.  4. Pt to tolerate HEP to improve ROM and independence with ADL's    Long Term Goals: 16 weeks  1.Report decreased R foot/ankle pain  < / =  0/10  to increase tolerance for working out   2.Patient goal: to return to walking and working out without pain  3.Increase strength to 4+/5 for R foot/ankle to increase tolerance for ADL and work activities.  4. Pt will report at CJ level (20-40% impaired) on Modified FIM score for mobility to demonstrate  increase in LE function and mobility in home and community environment.      Plan     Plan of care Certification: 12/12/2024 to 04/03/2025.    Outpatient Physical Therapy 1-2 times weekly for 16 weeks to include the following interventions: manual therapy, therapeutic exercise, therapeutic activities, and neuromuscular re-education.    Isaías Ramos, PT, DPT

## 2024-12-16 ENCOUNTER — OFFICE VISIT (OUTPATIENT)
Dept: SLEEP MEDICINE | Facility: CLINIC | Age: 44
End: 2024-12-16
Payer: COMMERCIAL

## 2024-12-16 VITALS
SYSTOLIC BLOOD PRESSURE: 131 MMHG | WEIGHT: 315 LBS | DIASTOLIC BLOOD PRESSURE: 83 MMHG | HEIGHT: 74 IN | BODY MASS INDEX: 40.43 KG/M2 | HEART RATE: 80 BPM

## 2024-12-16 DIAGNOSIS — G47.33 OSA (OBSTRUCTIVE SLEEP APNEA): Primary | ICD-10-CM

## 2024-12-16 DIAGNOSIS — R40.0 SOMNOLENCE: ICD-10-CM

## 2024-12-16 PROCEDURE — 3061F NEG MICROALBUMINURIA REV: CPT | Mod: CPTII,S$GLB,, | Performed by: INTERNAL MEDICINE

## 2024-12-16 PROCEDURE — 99214 OFFICE O/P EST MOD 30 MIN: CPT | Mod: S$GLB,,, | Performed by: INTERNAL MEDICINE

## 2024-12-16 PROCEDURE — 99999 PR PBB SHADOW E&M-EST. PATIENT-LVL III: CPT | Mod: PBBFAC,,, | Performed by: INTERNAL MEDICINE

## 2024-12-16 PROCEDURE — 3075F SYST BP GE 130 - 139MM HG: CPT | Mod: CPTII,S$GLB,, | Performed by: INTERNAL MEDICINE

## 2024-12-16 PROCEDURE — 1159F MED LIST DOCD IN RCRD: CPT | Mod: CPTII,S$GLB,, | Performed by: INTERNAL MEDICINE

## 2024-12-16 PROCEDURE — 3044F HG A1C LEVEL LT 7.0%: CPT | Mod: CPTII,S$GLB,, | Performed by: INTERNAL MEDICINE

## 2024-12-16 PROCEDURE — 3079F DIAST BP 80-89 MM HG: CPT | Mod: CPTII,S$GLB,, | Performed by: INTERNAL MEDICINE

## 2024-12-16 PROCEDURE — 3008F BODY MASS INDEX DOCD: CPT | Mod: CPTII,S$GLB,, | Performed by: INTERNAL MEDICINE

## 2024-12-16 PROCEDURE — 3066F NEPHROPATHY DOC TX: CPT | Mod: CPTII,S$GLB,, | Performed by: INTERNAL MEDICINE

## 2024-12-16 NOTE — PROGRESS NOTES
ESTABLISHED PATIENT VISIT    Baudilio Potts  is a pleasant 44 y.o. male  with PMH significant for  HTN, preDM, depression, BMI 39+, LALITO (dx 2017 in [place])       Here today for: BiPAP follow-up     Since last visit:   See interval history in table below    Past Medical History:   Diagnosis Date    Hypertension      Patient Active Problem List   Diagnosis    Hypertension    Obesity    LALITO (obstructive sleep apnea)    Major depression    Morbid obesity with BMI of 40.0-44.9, adult    Essential hypertension    Prediabetes    On pre-exposure prophylaxis for HIV    History of syphilis    Vitamin D deficiency    Anemia    Erectile dysfunction    Hypogonadism in male    Right Achilles tendinitis, insertion    Decreased range of motion of right ankle    Heel pain, chronic, right       Current Outpatient Medications:     amlodipine (NORVASC) 10 MG tablet, TAKE 1 TABLET BY MOUTH EVERY DAY, Disp: 90 tablet, Rfl: 1    anastrozole (ARIMIDEX) 1 mg Tab, Take 1 mg by mouth every 7 days., Disp: , Rfl:     blood pressure monitor Kit, Use daily to check blood pressure as directed., Disp: 1 each, Rfl: 0    citalopram (CELEXA) 40 MG tablet, Take 1 tablet (40 mg total) by mouth once daily., Disp: 90 tablet, Rfl: 3    emtricitabine-tenofovir 200-300 mg (TRUVADA) 200-300 mg Tab, Take 1 tablet by mouth once daily., Disp: , Rfl:     hydroCHLOROthiazide (HYDRODIURIL) 12.5 MG Tab, Take 1 tablet (12.5 mg total) by mouth once daily., Disp: 90 tablet, Rfl: 0    methylPREDNISolone (MEDROL, ALIA,) 4 mg tablet, Take as instructed on package, Disp: 1 each, Rfl: 0    tadalafiL (CIALIS) 20 MG Tab, Take 1 tablet (20 mg total) by mouth daily as needed (take 30-60 minutes before on an empty stomach)., Disp: 10 tablet, Rfl: 12    tirzepatide 7.5 mg/0.5 mL PnIj, , Disp: , Rfl:     vitamin D (VITAMIN D3) 1000 units Tab, Take 1,000 Units by mouth once daily., Disp: , Rfl:      Vitals:    12/16/24 1511   BP: 131/83   BP Location: Right arm   Patient Position:  "Sitting   Pulse: 80   Weight: (!) 156.5 kg (345 lb 0.3 oz)   Height: 6' 2" (1.88 m)        Physical Exam:    GEN:   Well-appearing  Psych:  Appropriate affect, demonstrates insight  SKIN:  No rash on the face or bridge of the nose      LABS:   Lab Results   Component Value Date    HGB 14.2 07/03/2024    CO2 31 (H) 07/03/2024       RECORDS REVIEWED PREVIOUSLY:    HST 7/14/23: AHI 50, RDI 86    10/4/23: 30/30 x 5hrs 25mins, 25/16 PS 4, leak (8/53/70.7), AHI 3.7    ASSESSMENT    PROBLEM DESCRIPTION/ Sx on Presentation Interval Hx STATUS PLAN    LALITO   Dx 2017 in [place] (unable to obtain study)  HST 2023 AHI 50, RDI 86  Using BiPAP nightly      PAP history   Dx Study    Mask Nasal mask + mouth tape   DME HME   My Air    CPAP age AirCurve 10 VAuto  8/21/23   PAP altn    Benefits Sleeps better   PROBS           LOV 10.4.23: LB    Setup   12.15.24: 30/30 x 6h2min, V auto rogelio 16 (16.8/17.8/18), ps 4, max 25, leak 6/47/65, AHI 4.5 (o 3.1)       Wearing nightly, doing well  controlled       PAP PLAN   E min 16 cwp    I max 25 cwp   PS/epr 4   RAMP    Other    Altn.    Press  chg Doing well      The patient is using and benefitting from PAP therapy    CHECKOUT   Recall 1 year   DME    Other           Sleepiness   + sleepiness when inactive   ESS 13/24 on intake (reviewed from 9/21/15)    SLEEP SCHEDULE   Environment    Bed Time 10-11PM   Sleep Latency 1hr   Arousals 2   Nocturia 1   Back to sleep 20mins   Wake time 5:30AM work day, 7AM off day   Naps one   Work         Occasional sleepiness around mid day   largely controlled     -LALITO management as above     Nocturia   x 2 per sleep period Usually once , occasionally none   stable     -LALITO management as above     Other issues:             "

## 2024-12-17 ENCOUNTER — CLINICAL SUPPORT (OUTPATIENT)
Dept: REHABILITATION | Facility: HOSPITAL | Age: 44
End: 2024-12-17
Payer: COMMERCIAL

## 2024-12-17 DIAGNOSIS — M25.671 DECREASED RANGE OF MOTION OF RIGHT ANKLE: Primary | ICD-10-CM

## 2024-12-17 DIAGNOSIS — G89.29 HEEL PAIN, CHRONIC, RIGHT: ICD-10-CM

## 2024-12-17 DIAGNOSIS — M79.671 HEEL PAIN, CHRONIC, RIGHT: ICD-10-CM

## 2024-12-17 PROCEDURE — 97530 THERAPEUTIC ACTIVITIES: CPT

## 2024-12-17 PROCEDURE — 97140 MANUAL THERAPY 1/> REGIONS: CPT

## 2024-12-17 PROCEDURE — 97110 THERAPEUTIC EXERCISES: CPT

## 2024-12-17 PROCEDURE — 97112 NEUROMUSCULAR REEDUCATION: CPT

## 2024-12-17 NOTE — PROGRESS NOTES
"[facility]   Physical Therapy Treatment Note      Name: Baudilio Potts  Clinic Number: [id number]    Therapy Diagnosis:   Encounter Diagnoses   Name Primary?    Decreased range of motion of right ankle Yes    Heel pain, chronic, right      Physician: Erickson Lopez MD    Visit Date: 12/17/2024    Physician Orders: PT Eval and Treat   Medical Diagnosis from Referral: M76.61 (ICD-10-CM) - Right Achilles tendinitis    Evaluation Date: 12/12/2024  Authorization Period Expiration: 12/10/2025  Plan of Care Expiration: 04/03/2025  Progress Note Due: 1/12/2024  Visit # / Visits authorized: 1/5  FOTO: 1/3     Precautions: Standard,     PTA Visit #: 0/5     Time In: 9:57 am  Time Out: 10:55 am   Total Billable Time: 58 minutes    Subjective     Pt reports: He has been feeling better, and he has not been having much pain. He reports the exercises help when he has pain  He was compliant with home exercise program.  Response to previous treatment: first visit   Functional change: ongoing    Pain: 2/10  Location: right ankles     Objective      Objective Measures updated at progress report unless specified.     Treatment     Baudilio received the treatments listed below:      therapeutic exercises to develop strength, endurance, ROM, flexibility, posture, and core stabilization for 8 minutes including:  Long sitting PF 3x10 RTB   Standing calf raises (more on L than R) 2x10 5" holds     manual therapy techniques: Joint mobilizations, Manual traction, Myofacial release, Manual Lymphatic Drainage, Soft tissue Mobilization, and Friction Massage were applied to the: R ankle/foot for 9 minutes, including:  TC distraction manipulation> pt agreeable   AP TC glides gr III-IV  Subtalar eversion gr III-IV    neuromuscular re-education activities to improve: Balance, Coordination, Kinesthetic, Sense, Proprioception, and Posture for 26 minutes. The following activities were included:  Ankle PF isometrics 45" " "holds, 2 minute breaks 3x   Heel slides seated 20x   Arch lifts 20x 5" holds   Towel scrunches 2 minutes   Sneaky lunges 2x15 5" holds     therapeutic activities to improve functional performance for 15  minutes, including:  Shuttle squats 4 bands 2x10   Shuttle sidelying squats 2 bands 2x10 each   Shuttle heel raises 2 bands 2x10    Patient Education and Home Exercises       Education provided:   - continuing HEP  -VC and TC for proper form for exercises    Written Home Exercises Provided: YES. Exercises were reviewed and Baudilio was able to demonstrate them prior to the end of the session.  Baudilio demonstrated good understanding of the education provided. See EMR under Patient Instructions for exercises provided during therapy sessions    Assessment     Pt is progressing well with decreased pain. Progressed with strengthening up the chain and light loading of the achilles tendon which were tolerated with no increases in pain. Progressed with increased DF in weightbearing through a pain free range. Responds well to manual therapy with increased ROM. Will continue to progressively load achilles tendon.     Baudilio Is progressing well towards his goals.   Pt prognosis is Good.     Pt will continue to benefit from skilled outpatient physical therapy to address the deficits listed in the problem list box on initial evaluation, provide pt/family education and to maximize pt's level of independence in the home and community environment.     Pt's spiritual, cultural and educational needs considered and pt agreeable to plan of care and goals.     Anticipated barriers to physical therapy: work      GOALS:   Short Term Goals:  8 weeks  1.Report decreased R foot/ankle pain  < / =  2/10  to increase tolerance for walking  2. Increase ROM by 10 degrees in order to R foot/ankle to increase tolerance for ADL and work activities.  4. Pt to tolerate HEP to improve ROM and independence with ADL's     Long Term Goals: 16 " weeks  1.Report decreased R foot/ankle pain  < / =  0/10  to increase tolerance for working out   2.Patient goal: to return to walking and working out without pain  3.Increase strength to 4+/5 for R foot/ankle to increase tolerance for ADL and work activities.  4. Pt will report at CJ level (20-40% impaired) on Modified FIM score for mobility to demonstrate increase in LE function and mobility in home and community environment.       Plan   Plan of care Certification: 12/12/2024 to 04/03/2025.     Continue POC. Gradually load achilles tendon.     Isaías Ramos PT, DPT

## 2024-12-19 ENCOUNTER — CLINICAL SUPPORT (OUTPATIENT)
Dept: REHABILITATION | Facility: HOSPITAL | Age: 44
End: 2024-12-19
Payer: COMMERCIAL

## 2024-12-19 DIAGNOSIS — G89.29 HEEL PAIN, CHRONIC, RIGHT: ICD-10-CM

## 2024-12-19 DIAGNOSIS — M79.671 HEEL PAIN, CHRONIC, RIGHT: ICD-10-CM

## 2024-12-19 DIAGNOSIS — M25.671 DECREASED RANGE OF MOTION OF RIGHT ANKLE: Primary | ICD-10-CM

## 2024-12-19 PROCEDURE — 97530 THERAPEUTIC ACTIVITIES: CPT

## 2024-12-19 PROCEDURE — 97110 THERAPEUTIC EXERCISES: CPT

## 2024-12-19 PROCEDURE — 97112 NEUROMUSCULAR REEDUCATION: CPT

## 2024-12-19 PROCEDURE — 97140 MANUAL THERAPY 1/> REGIONS: CPT

## 2024-12-19 NOTE — PROGRESS NOTES
"[facility]   Physical Therapy Treatment Note      Name: Baudilio Potts  Clinic Number: [id number]    Therapy Diagnosis:   Encounter Diagnoses   Name Primary?    Decreased range of motion of right ankle Yes    Heel pain, chronic, right        Physician: Erickson Lopez MD    Visit Date: 12/19/2024    Physician Orders: PT Eval and Treat   Medical Diagnosis from Referral: M76.61 (ICD-10-CM) - Right Achilles tendinitis    Evaluation Date: 12/12/2024  Authorization Period Expiration: 12/10/2025  Plan of Care Expiration: 04/03/2025  Progress Note Due: 1/12/2024  Visit # / Visits authorized: 2/5  FOTO: 1/3     Precautions: Standard,     PTA Visit #: 0/5     Time In: 10:02 am  Time Out: 10:55 am   Total Billable Time: 53 minutes    Subjective     Pt reports: He has been feeling pretty good since last session and is having a little soreness today but no pain  He was compliant with home exercise program.  Response to previous treatment: first visit   Functional change: ongoing    Pain: 2/10  Location: right ankles     Objective      Objective Measures updated at progress report unless specified.     Treatment     Baudilio received the treatments listed below:      therapeutic exercises to develop strength, endurance, ROM, flexibility, posture, and core stabilization for 8 minutes including:  Long sitting PF 3x10 RTB   Seated calf raises 2# 2x20    Not today:  Standing calf raises (more on L than R) 2x10 5" holds     manual therapy techniques: Joint mobilizations, Manual traction, Myofacial release, Manual Lymphatic Drainage, Soft tissue Mobilization, and Friction Massage were applied to the: R ankle/foot for 8 minutes, including:  TC distraction manipulation> pt agreeable   AP TC glides gr III-IV  Subtalar eversion gr III-IV    neuromuscular re-education activities to improve: Balance, Coordination, Kinesthetic, Sense, Proprioception, and Posture for 24 minutes. The following activities were " "included:  Ankle PF isometrics 45" holds, 2 minute breaks 3x   1/2 kneeling dorsiflexion with RTB 20x 5" holds   Arch lifts 20x 5" holds   Towel scrunches 2 minutes   Sneaky lunges 2x15 5" holds     therapeutic activities to improve functional performance for 13 minutes, including:  Shuttle squats 4 bands 2x10   Shuttle sidelying squats 2 bands 2x10 each   Shuttle donkey kicks 2x10 1 band    Not today:   Shuttle heel raises 2 bands 2x10    Patient Education and Home Exercises       Education provided:   - continuing HEP  -VC and TC for proper form for exercises    Written Home Exercises Provided: YES. Exercises were reviewed and Baudilio was able to demonstrate them prior to the end of the session.  Baudilio demonstrated good understanding of the education provided. See EMR under Patient Instructions for exercises provided during therapy sessions    Assessment     Progressed with DF in weightbearing and soleus strengthening today which he tolerated well with no pain. Continue to increase strength in more proximal muscles in the beginning stages before loading the tendon more. Responds well to manual therapy with increased ROM. Will continue to progressively load achilles tendon.     Baudilio Is progressing well towards his goals.   Pt prognosis is Good.     Pt will continue to benefit from skilled outpatient physical therapy to address the deficits listed in the problem list box on initial evaluation, provide pt/family education and to maximize pt's level of independence in the home and community environment.     Pt's spiritual, cultural and educational needs considered and pt agreeable to plan of care and goals.     Anticipated barriers to physical therapy: work      GOALS:   Short Term Goals:  8 weeks  1.Report decreased R foot/ankle pain  < / =  2/10  to increase tolerance for walking  2. Increase ROM by 10 degrees in order to R foot/ankle to increase tolerance for ADL and work activities.  4. Pt to tolerate HEP to " improve ROM and independence with ADL's     Long Term Goals: 16 weeks  1.Report decreased R foot/ankle pain  < / =  0/10  to increase tolerance for working out   2.Patient goal: to return to walking and working out without pain  3.Increase strength to 4+/5 for R foot/ankle to increase tolerance for ADL and work activities.  4. Pt will report at CJ level (20-40% impaired) on Modified FIM score for mobility to demonstrate increase in LE function and mobility in home and community environment.       Plan   Plan of care Certification: 12/12/2024 to 04/03/2025.     Continue POC. Gradually load achilles tendon.     Isaías Sculthorp PT, DPT

## 2024-12-21 DIAGNOSIS — I10 ESSENTIAL HYPERTENSION: ICD-10-CM

## 2024-12-21 NOTE — TELEPHONE ENCOUNTER
No care due was identified.  [facility] Embedded Care Due Messages. Reference number: [id number].   12/21/2024 1:06:28 AM CST

## 2024-12-21 NOTE — TELEPHONE ENCOUNTER
Refill Routing Note   Medication(s) are not appropriate for processing by [facility] for the following reason(s):      New or recently adjusted medication    [facility] action(s):  Defer Care Due:  None identified            Appointments  past 12m or future 3m with PCP    Date Provider   Last Visit   9/25/2024 Tammy Rivera MD   Next Visit   Visit date not found Tammy Rivera MD   ED visits in past 90 days: 0        Note composed:7:43 AM 12/21/2024

## 2024-12-23 ENCOUNTER — CLINICAL SUPPORT (OUTPATIENT)
Dept: REHABILITATION | Facility: HOSPITAL | Age: 44
End: 2024-12-23
Payer: COMMERCIAL

## 2024-12-23 DIAGNOSIS — M25.671 DECREASED RANGE OF MOTION OF RIGHT ANKLE: Primary | ICD-10-CM

## 2024-12-23 DIAGNOSIS — M79.671 HEEL PAIN, CHRONIC, RIGHT: ICD-10-CM

## 2024-12-23 DIAGNOSIS — G89.29 HEEL PAIN, CHRONIC, RIGHT: ICD-10-CM

## 2024-12-23 PROCEDURE — 97530 THERAPEUTIC ACTIVITIES: CPT

## 2024-12-23 PROCEDURE — 97112 NEUROMUSCULAR REEDUCATION: CPT

## 2024-12-23 PROCEDURE — 97110 THERAPEUTIC EXERCISES: CPT

## 2024-12-23 PROCEDURE — 97140 MANUAL THERAPY 1/> REGIONS: CPT

## 2024-12-23 NOTE — PROGRESS NOTES
[facility]   Physical Therapy Treatment Note      Name: Baudilio Potts  Clinic Number: [id number]    Therapy Diagnosis:   Encounter Diagnoses   Name Primary?    Decreased range of motion of right ankle Yes    Heel pain, chronic, right      Physician: Erickson Lopez MD    Visit Date: 12/23/2024    Physician Orders: PT Eval and Treat   Medical Diagnosis from Referral: M76.61 (ICD-10-CM) - Right Achilles tendinitis    Evaluation Date: 12/12/2024  Authorization Period Expiration: 12/10/2025  Plan of Care Expiration: 04/03/2025  Progress Note Due: 1/12/2024  Visit # / Visits authorized: 3/5  FOTO: 1/3     Precautions: Standard,     PTA Visit #: 0/5     Time In: 8:59 am  Time Out: 10:00 am   Total Billable Time: 61 minutes    Subjective     Pt reports: He felt good after last session and did not have pain   He was compliant with home exercise program.  Response to previous treatment: first visit   Functional change: ongoing    Pain: 2/10  Location: right ankles     Objective      Objective Measures updated at progress report unless specified.     Treatment     Baudilio received the treatments listed below:      therapeutic exercises to develop strength, endurance, ROM, flexibility, posture, and core stabilization for 14 minutes including:  Knee extension machine 2x10   Hamstring curl machine 2x10   Standing calf raises (more on L than R) 2x10     Not today:  Seated calf raises 2# 2x20    manual therapy techniques: Joint mobilizations, Manual traction, Myofacial release, Manual Lymphatic Drainage, Soft tissue Mobilization, and Friction Massage were applied to the: R ankle/foot for 9 minutes, including:  TC distraction manipulation> pt agreeable   AP TC glides gr III-IV  Subtalar eversion gr III-IV    neuromuscular re-education activities to improve: Balance, Coordination, Kinesthetic, Sense, Proprioception, and Posture for 25 minutes. The following activities were included:  Ankle PF  "isometrics 45" holds, 2 minute breaks 3x   1/2 kneeling dorsiflexion with RTB 20x 5" holds   Arch lifts standing 20x 5" holds   Sneaky lunges with PF 3x6 5" holds     Not today:   Towel scrunches 2 minutes     therapeutic activities to improve functional performance for 13 minutes, including:  Box squats 20# 2x10   Lateral band walks 5 laps      Not today:   Shuttle squats 4 bands 2x10   Shuttle sidelying squats 2 bands 2x10 each   Shuttle donkey kicks 2x10 1 band  Shuttle heel raises 2 bands 2x10    Patient Education and Home Exercises       Education provided:   - continuing HEP  -VC and TC for proper form for exercises    Written Home Exercises Provided: YES. Exercises were reviewed and Baudilio was able to demonstrate them prior to the end of the session.  Baudilio demonstrated good understanding of the education provided. See EMR under Patient Instructions for exercises provided during therapy sessions    Assessment     Progressed with more functional movement patterns today which were well tolerated. Pt noted fatigue and weakness in heel after standing heel raises and sneaky lunges. Loaded the posterior chain today with hamstring exercises which were well tolerated. Continue to progressively load achilles tendon.     Baudilio Is progressing well towards his goals.   Pt prognosis is Good.     Pt will continue to benefit from skilled outpatient physical therapy to address the deficits listed in the problem list box on initial evaluation, provide pt/family education and to maximize pt's level of independence in the home and community environment.     Pt's spiritual, cultural and educational needs considered and pt agreeable to plan of care and goals.     Anticipated barriers to physical therapy: work      GOALS:   Short Term Goals:  8 weeks  1.Report decreased R foot/ankle pain  < / =  2/10  to increase tolerance for walking  2. Increase ROM by 10 degrees in order to R foot/ankle to increase tolerance for ADL and " work activities.  4. Pt to tolerate HEP to improve ROM and independence with ADL's     Long Term Goals: 16 weeks  1.Report decreased R foot/ankle pain  < / =  0/10  to increase tolerance for working out   2.Patient goal: to return to walking and working out without pain  3.Increase strength to 4+/5 for R foot/ankle to increase tolerance for ADL and work activities.  4. Pt will report at CJ level (20-40% impaired) on Modified FIM score for mobility to demonstrate increase in LE function and mobility in home and community environment.       Plan   Plan of care Certification: 12/12/2024 to 04/03/2025.     Continue POC. Gradually load achilles tendon.     Isaías Livingstonhoyana PT, DPT

## 2024-12-24 RX ORDER — HYDROCHLOROTHIAZIDE 12.5 MG/1
12.5 TABLET ORAL
Qty: 90 TABLET | Refills: 3 | Status: SHIPPED | OUTPATIENT
Start: 2024-12-24

## 2024-12-27 ENCOUNTER — CLINICAL SUPPORT (OUTPATIENT)
Dept: REHABILITATION | Facility: HOSPITAL | Age: 44
End: 2024-12-27
Payer: COMMERCIAL

## 2024-12-27 DIAGNOSIS — M25.671 DECREASED RANGE OF MOTION OF RIGHT ANKLE: Primary | ICD-10-CM

## 2024-12-27 DIAGNOSIS — M79.671 HEEL PAIN, CHRONIC, RIGHT: ICD-10-CM

## 2024-12-27 DIAGNOSIS — G89.29 HEEL PAIN, CHRONIC, RIGHT: ICD-10-CM

## 2024-12-27 PROCEDURE — 97530 THERAPEUTIC ACTIVITIES: CPT

## 2024-12-27 PROCEDURE — 97112 NEUROMUSCULAR REEDUCATION: CPT

## 2024-12-27 PROCEDURE — 97140 MANUAL THERAPY 1/> REGIONS: CPT

## 2024-12-27 PROCEDURE — 97110 THERAPEUTIC EXERCISES: CPT

## 2024-12-27 NOTE — PROGRESS NOTES
[facility]   Physical Therapy Treatment Note      Name: Baudilio Potts  Clinic Number: [id number]    Therapy Diagnosis:   Encounter Diagnoses   Name Primary?    Decreased range of motion of right ankle Yes    Heel pain, chronic, right        Physician: Erickson Lopez MD    Visit Date: 12/27/2024    Physician Orders: PT Eval and Treat   Medical Diagnosis from Referral: M76.61 (ICD-10-CM) - Right Achilles tendinitis    Evaluation Date: 12/12/2024  Authorization Period Expiration: 12/10/2025  Plan of Care Expiration: 04/03/2025  Progress Note Due: 1/12/2024  Visit # / Visits authorized: 4/5  FOTO: 1/3     Precautions: Standard,     PTA Visit #: 0/5     Time In: 10:00 am  Time Out: 10:59 am   Total Billable Time: 59 minutes    Subjective     Pt reports: His heel has been feeling better, but he still has some pain. He has less pain when he wakes up in the morning.   He was compliant with home exercise program.  Response to previous treatment: first visit   Functional change: ongoing    Pain: 2/10  Location: right ankles     Objective      Objective Measures updated at progress report unless specified.     Treatment     Baudilio received the treatments listed below:      therapeutic exercises to develop strength, endurance, ROM, flexibility, posture, and core stabilization for 13 minutes including:  Knee extension machine 2x10 75#  Hamstring curl machine 2x10 85#  Standing calf raises equal weight 2x10     Not today:  Seated calf raises 2# 2x20    manual therapy techniques: Joint mobilizations, Manual traction, Myofacial release, Manual Lymphatic Drainage, Soft tissue Mobilization, and Friction Massage were applied to the: R ankle/foot for 8 minutes, including:  TC distraction manipulation> pt agreeable   AP TC glides gr III-IV  Subtalar eversion gr III-IV    neuromuscular re-education activities to improve: Balance, Coordination, Kinesthetic, Sense, Proprioception, and Posture for 15  "minutes. The following activities were included:  Ankle PF isometrics 45" holds, 2 minute breaks 3x   1/2 kneeling dorsiflexion with RTB 20x 5" holds   Arch lifts standing 20x 5" holds   Sneaky lunges with 20x 5" holds     Not today:   Towel scrunches 2 minutes     therapeutic activities to improve functional performance for 23 minutes, including:  Lateral band walks 5 laps    Shuttle squats 4 bands 2x10   Shuttle donkey kicks 2x10 1 band  Shuttle heel raises 2 bands 2x10  Shuttle heel raises 2 up 1 down 2 bands 15x each     Not today:  Box squats 20# 2x10   Shuttle sidelying squats 2 bands 2x10 each     Patient Education and Home Exercises       Education provided:   - continuing HEP  -VC and TC for proper form for exercises    Written Home Exercises Provided: YES. Exercises were reviewed and Baudilio was able to demonstrate them prior to the end of the session.  Baudilio demonstrated good understanding of the education provided. See EMR under Patient Instructions for exercises provided during therapy sessions    Assessment     Introduced eccentric calf exercises today on the shuttle which he tolerated with no increase in pain. Pt noted fatigue upon completion of exercises today. Continued loading the posterior chain today with hamstring curls, and removed the calf raises from sneaky lunges. Pt will continue to progressively load achilles tendon as tolerated. Educated to watch for signs and symptoms of an overloaded tendon over the weekend.     Baudilio Is progressing well towards his goals.   Pt prognosis is Good.     Pt will continue to benefit from skilled outpatient physical therapy to address the deficits listed in the problem list box on initial evaluation, provide pt/family education and to maximize pt's level of independence in the home and community environment.     Pt's spiritual, cultural and educational needs considered and pt agreeable to plan of care and goals.     Anticipated barriers to physical " therapy: work      GOALS:   Short Term Goals:  8 weeks  1.Report decreased R foot/ankle pain  < / =  2/10  to increase tolerance for walking  2. Increase ROM by 10 degrees in order to R foot/ankle to increase tolerance for ADL and work activities.  4. Pt to tolerate HEP to improve ROM and independence with ADL's     Long Term Goals: 16 weeks  1.Report decreased R foot/ankle pain  < / =  0/10  to increase tolerance for working out   2.Patient goal: to return to walking and working out without pain  3.Increase strength to 4+/5 for R foot/ankle to increase tolerance for ADL and work activities.  4. Pt will report at CJ level (20-40% impaired) on Modified FIM score for mobility to demonstrate increase in LE function and mobility in home and community environment.       Plan   Plan of care Certification: 12/12/2024 to 04/03/2025.     Continue POC. Gradually load achilles tendon.     Isaías Peaceulthorp PT, DPT

## 2024-12-31 ENCOUNTER — CLINICAL SUPPORT (OUTPATIENT)
Dept: REHABILITATION | Facility: HOSPITAL | Age: 44
End: 2024-12-31
Payer: COMMERCIAL

## 2024-12-31 DIAGNOSIS — G89.29 HEEL PAIN, CHRONIC, RIGHT: ICD-10-CM

## 2024-12-31 DIAGNOSIS — M79.671 HEEL PAIN, CHRONIC, RIGHT: ICD-10-CM

## 2024-12-31 DIAGNOSIS — M25.671 DECREASED RANGE OF MOTION OF RIGHT ANKLE: Primary | ICD-10-CM

## 2024-12-31 PROCEDURE — 97140 MANUAL THERAPY 1/> REGIONS: CPT

## 2024-12-31 PROCEDURE — 97530 THERAPEUTIC ACTIVITIES: CPT

## 2024-12-31 PROCEDURE — 97110 THERAPEUTIC EXERCISES: CPT

## 2024-12-31 PROCEDURE — 97112 NEUROMUSCULAR REEDUCATION: CPT

## 2024-12-31 NOTE — PROGRESS NOTES
[facility]   Physical Therapy Treatment Note      Name: Baudilio Potts  Clinic Number: [id number]    Therapy Diagnosis:   Encounter Diagnoses   Name Primary?    Decreased range of motion of right ankle Yes    Heel pain, chronic, right      Physician: Erickson Lopez MD    Visit Date: 12/31/2024    Physician Orders: PT Eval and Treat   Medical Diagnosis from Referral: M76.61 (ICD-10-CM) - Right Achilles tendinitis    Evaluation Date: 12/12/2024  Authorization Period Expiration: 12/10/2025  Plan of Care Expiration: 04/03/2025  Progress Note Due: 1/12/2024  Visit # / Visits authorized: 5/5  FOTO: 1/3     Precautions: Standard,     PTA Visit #: 0/5     Time In: 9005   Time Out: 1002  Total Billable Time: 57 minutes    Subjective     Pt reports: He is in some pain this morning but overall he has been feeling better    He was compliant with home exercise program.  Response to previous treatment: first visit   Functional change: ongoing    Pain: 2/10  Location: right ankles     Objective      Objective Measures updated at progress report unless specified.     Treatment     Baudilio received the treatments listed below:      therapeutic exercises to develop strength, endurance, ROM, flexibility, posture, and core stabilization for 11 minutes including:  Knee extension machine 2x10 75#  Hamstring curl machine 2x10 85#  Standing calf raises 2 up 1 down 2x10     Not today:  Seated calf raises 2# 2x20    manual therapy techniques: Joint mobilizations, Manual traction, Myofacial release, Manual Lymphatic Drainage, Soft tissue Mobilization, and Friction Massage were applied to the: R ankle/foot for 8 minutes, including:  TC distraction manipulation> pt agreeable   AP TC glides gr III-IV  Subtalar eversion gr III-IV    neuromuscular re-education activities to improve: Balance, Coordination, Kinesthetic, Sense, Proprioception, and Posture for 14 minutes. The following activities were included:  Ankle  "PF isometrics 45" holds, 2 minute breaks 3x   Arch lifts standing 20x 5" holds   Sneaky lunges with 20x 5" holds     Not today:   Towel scrunches 2 minutes   1/2 kneeling dorsiflexion with RTB 20x 5" holds     therapeutic activities to improve functional performance for 25 minutes, including:  Lateral band walks 5 laps    Shuttle squats 4 bands 2x10   Shuttle donkey kicks 2x10 2 band  Shuttle heel raises 2 up 1 down 2.5 bands 15x each   Box squats 20# 4x8    Not today:  Shuttle heel raises 2 bands 2x10  Shuttle sidelying squats 2 bands 2x10 each     Patient Education and Home Exercises       Education provided:   - continuing HEP  -VC and TC for proper form for exercises    Written Home Exercises Provided: YES. Exercises were reviewed and Baudilio was able to demonstrate them prior to the end of the session.  Baudilio demonstrated good understanding of the education provided. See EMR under Patient Instructions for exercises provided during therapy sessions    Assessment     Continued to progress with eccentric calf exercises today to neutral DF which he is tolerating well with no increases in pain. Will continue to load achilles tendon progressively to prevent overload and prepare pt for more loads when returning to the gym. Continue to educate pt on watching for symptoms of overload.     Baudilio Is progressing well towards his goals.   Pt prognosis is Good.     Pt will continue to benefit from skilled outpatient physical therapy to address the deficits listed in the problem list box on initial evaluation, provide pt/family education and to maximize pt's level of independence in the home and community environment.     Pt's spiritual, cultural and educational needs considered and pt agreeable to plan of care and goals.     Anticipated barriers to physical therapy: work      GOALS:   Short Term Goals:  8 weeks  1.Report decreased R foot/ankle pain  < / =  2/10  to increase tolerance for walking  2. Increase ROM by 10 " degrees in order to R foot/ankle to increase tolerance for ADL and work activities.  4. Pt to tolerate HEP to improve ROM and independence with ADL's     Long Term Goals: 16 weeks  1.Report decreased R foot/ankle pain  < / =  0/10  to increase tolerance for working out   2.Patient goal: to return to walking and working out without pain  3.Increase strength to 4+/5 for R foot/ankle to increase tolerance for ADL and work activities.  4. Pt will report at CJ level (20-40% impaired) on Modified FIM score for mobility to demonstrate increase in LE function and mobility in home and community environment.       Plan   Plan of care Certification: 12/12/2024 to 04/03/2025.     Continue POC. Gradually load achilles tendon.     Isaías Livingstonhoyana PT, DPT

## 2025-01-09 ENCOUNTER — CLINICAL SUPPORT (OUTPATIENT)
Dept: REHABILITATION | Facility: HOSPITAL | Age: 45
End: 2025-01-09
Payer: COMMERCIAL

## 2025-01-09 DIAGNOSIS — M25.671 DECREASED RANGE OF MOTION OF RIGHT ANKLE: Primary | ICD-10-CM

## 2025-01-09 DIAGNOSIS — G89.29 HEEL PAIN, CHRONIC, RIGHT: ICD-10-CM

## 2025-01-09 DIAGNOSIS — M79.671 HEEL PAIN, CHRONIC, RIGHT: ICD-10-CM

## 2025-01-09 PROCEDURE — 97110 THERAPEUTIC EXERCISES: CPT

## 2025-01-09 PROCEDURE — 97530 THERAPEUTIC ACTIVITIES: CPT

## 2025-01-09 PROCEDURE — 97112 NEUROMUSCULAR REEDUCATION: CPT

## 2025-01-09 PROCEDURE — 97140 MANUAL THERAPY 1/> REGIONS: CPT

## 2025-01-09 NOTE — PROGRESS NOTES
[facility]   Physical Therapy Treatment Note      Name: Baudilio Potts  Clinic Number: [id number]    Therapy Diagnosis:   Encounter Diagnoses   Name Primary?    Decreased range of motion of right ankle Yes    Heel pain, chronic, right        Physician: Erickson Lopez MD    Visit Date: 1/9/2025    Physician Orders: PT Eval and Treat   Medical Diagnosis from Referral: M76.61 (ICD-10-CM) - Right Achilles tendinitis    Evaluation Date: 12/12/2024  Authorization Period Expiration: 12/10/2025  Plan of Care Expiration: 04/03/2025  Progress Note Due: 1/12/2024  Visit # / Visits authorized: 1/10  FOTO: 1/3     Precautions: Standard,     PTA Visit #: 0/5     Time In: 1000  Time Out: 1100  Total Billable Time: 60 minutes    Subjective     Pt reports: He has had some pain off and on, but the pain is decreased in intensity compared to before therapy. He uses exercises to help decrease the pain as well.    He was compliant with home exercise program.  Response to previous treatment: first visit   Functional change: ongoing    Pain: 2/10  Location: right ankles     Objective      Objective Measures updated at progress report unless specified.     Treatment     Baudilio received the treatments listed below:      therapeutic exercises to develop strength, endurance, ROM, flexibility, posture, and core stabilization for 10 minutes including:  Knee extension machine 2x10 75#  Hamstring curl machine 2x10 85#  Standing calf raises 2 up 1 down 2x10   Seated calf raises 10# 2x20    manual therapy techniques: Joint mobilizations, Manual traction, Myofacial release, Manual Lymphatic Drainage, Soft tissue Mobilization, and Friction Massage were applied to the: R ankle/foot for 8 minutes, including:  TC distraction manipulation> pt agreeable   AP TC glides gr III-IV  Subtalar eversion gr III-IV    neuromuscular re-education activities to improve: Balance, Coordination, Kinesthetic, Sense, Proprioception, and  "Posture for 12 minutes. The following activities were included:  Ankle PF isometrics 45" holds, 2 minute breaks 6x  Sneaky lunges with plantar flexion 4x8    Not today:   Towel scrunches 2 minutes   1/2 kneeling dorsiflexion with RTB 20x 5" holds     therapeutic activities to improve functional performance for 30 minutes, including:  Lateral band walks 5 laps GTB  Shuttle donkey kicks 2x10 2 band  Shuttle heel raises 2 up 1 down 2.5 bands 15x each   Box squats with arch lifts 20# 4x8  Standing calf raises 2 up 1 down 3x6 2" box    Not today:  Shuttle squats 4 bands 2x10   Shuttle heel raises 2 bands 2x10  Shuttle sidelying squats 2 bands 2x10 each     Patient Education and Home Exercises       Education provided:   - continuing HEP  -VC and TC for proper form for exercises    Written Home Exercises Provided: YES. Exercises were reviewed and Baudilio was able to demonstrate them prior to the end of the session.  Baudilio demonstrated good understanding of the education provided. See EMR under Patient Instructions for exercises provided during therapy sessions    Assessment     Progressed eccentric calf raises past neutral today which pt tolerated without increases in pain. Pt continues to note fatigue after the session. Pt educated on return to gym and looking for symptoms of overload after gym sessions. Educated on how to avoid overload and to continue progressively loading his calf. Continue to progress as tolerated.     Baudilio Is progressing well towards his goals.   Pt prognosis is Good.     Pt will continue to benefit from skilled outpatient physical therapy to address the deficits listed in the problem list box on initial evaluation, provide pt/family education and to maximize pt's level of independence in the home and community environment.     Pt's spiritual, cultural and educational needs considered and pt agreeable to plan of care and goals.     Anticipated barriers to physical therapy: work      GOALS: "   Short Term Goals:  8 weeks  1.Report decreased R foot/ankle pain  < / =  2/10  to increase tolerance for walking  2. Increase ROM by 10 degrees in order to R foot/ankle to increase tolerance for ADL and work activities.  4. Pt to tolerate HEP to improve ROM and independence with ADL's     Long Term Goals: 16 weeks  1.Report decreased R foot/ankle pain  < / =  0/10  to increase tolerance for working out   2.Patient goal: to return to walking and working out without pain  3.Increase strength to 4+/5 for R foot/ankle to increase tolerance for ADL and work activities.  4. Pt will report at CJ level (20-40% impaired) on Modified FIM score for mobility to demonstrate increase in LE function and mobility in home and community environment.       Plan   Plan of care Certification: 12/12/2024 to 04/03/2025.     Continue POC. Gradually load achilles tendon.     Isaías Livingstonhoyana PT, DPT

## 2025-01-14 ENCOUNTER — CLINICAL SUPPORT (OUTPATIENT)
Dept: REHABILITATION | Facility: HOSPITAL | Age: 45
End: 2025-01-14
Payer: COMMERCIAL

## 2025-01-14 DIAGNOSIS — G89.29 HEEL PAIN, CHRONIC, RIGHT: ICD-10-CM

## 2025-01-14 DIAGNOSIS — M25.671 DECREASED RANGE OF MOTION OF RIGHT ANKLE: Primary | ICD-10-CM

## 2025-01-14 DIAGNOSIS — M79.671 HEEL PAIN, CHRONIC, RIGHT: ICD-10-CM

## 2025-01-14 PROCEDURE — 97112 NEUROMUSCULAR REEDUCATION: CPT

## 2025-01-14 PROCEDURE — 97530 THERAPEUTIC ACTIVITIES: CPT

## 2025-01-14 PROCEDURE — 97110 THERAPEUTIC EXERCISES: CPT

## 2025-01-14 PROCEDURE — 97140 MANUAL THERAPY 1/> REGIONS: CPT

## 2025-01-14 NOTE — PROGRESS NOTES
[facility]   Physical Therapy Treatment Note      Name: Baudilio Potts  Clinic Number: [id number]    Therapy Diagnosis:   Encounter Diagnoses   Name Primary?    Decreased range of motion of right ankle Yes    Heel pain, chronic, right      Physician: Erickson Lopez MD    Visit Date: 1/14/2025    Physician Orders: PT Eval and Treat   Medical Diagnosis from Referral: M76.61 (ICD-10-CM) - Right Achilles tendinitis    Evaluation Date: 12/12/2024  Authorization Period Expiration: 12/10/2025  Plan of Care Expiration: 04/03/2025  Progress Note Due: 2/14/2024  Visit # / Visits authorized: 2/10  FOTO: 2/3     Precautions: Standard,     PTA Visit #: 0/5     Time In: 1100  Time Out: 1157  Total Billable Time: 57 minutes    Subjective     Pt reports: a little sore but not painful. Feels like he has improved since beginning therapy. When he has pain, it is less intense than when he started therapy.    He was compliant with home exercise program.  Response to previous treatment: soreness  Functional change: can walk further before pain begins     Pain: 2/10  Location: right ankles     Objective       Posture: B pes planus, increased R achilles thickness      Active Range of Motion:   Ankle Right Left   DF (knee extended) 5 0   Plantarflexion 60 70   Inversion 35 30   Eversion 35 20      Strength:  Ankle Right Left   Dorsiflexion 4+/5 4+/5   Plantarflexion 4/5* 5/5   Inversion 4/5 5/5   Eversion 5/5 5/5        Joint Mobility: hypomobile AP glides on R ankle, less compared to evaluation; hypomobile AP TC glides on R ankle      Palpation: minimal pain when touching achilles insertion      Sensation: intact     Functional Tests:   SLS EO: R: 30 sec.  L: 30 sec  SLS EC: R: 5 sec     L: 10 sec   Double leg squat: increased arch since evaluation and better hip hinge; still some eversion bilaterally   Single leg squat: no knee valgus, slight heel eversion on R leg     Treatment     Baudilio received the  "treatments listed below:      therapeutic exercises to develop strength, endurance, ROM, flexibility, posture, and core stabilization for 13 minutes including:  Standing calf raises 2 up 1 down 2x10   ROM and strength assessments     Not today:   Seated calf raises 10# 2x20  Knee extension machine 2x10 75#  Hamstring curl machine 2x10 85#    manual therapy techniques: Joint mobilizations, Manual traction, Myofacial release, Manual Lymphatic Drainage, Soft tissue Mobilization, and Friction Massage were applied to the: R ankle/foot for 8 minutes, including:  TC distraction manipulation> pt agreeable   AP TC glides gr III-IV  Subtalar eversion gr III-IV    neuromuscular re-education activities to improve: Balance, Coordination, Kinesthetic, Sense, Proprioception, and Posture for 13 minutes. The following activities were included:  Ankle PF isometrics 45" holds, 2 minute breaks 6x  Sneaky lunges with plantar flexion 4x8    Not today:   Towel scrunches 2 minutes   1/2 kneeling dorsiflexion with RTB 20x 5" holds     therapeutic activities to improve functional performance for 23 minutes, including:  Lateral band walks 5 laps GTB  Shuttle heel raises 2 up 1 down 2.5 bands 15x each   Box squats with arch lifts 20# 4x8  Standing calf raises 2 up 1 down 4x6 2" box  Single leg calf raises shuttle 1 band 2x10      Not today:  Shuttle donkey kicks 2x10 2 band  Shuttle squats 4 bands 2x10   Shuttle heel raises 2 bands 2x10  Shuttle sidelying squats 2 bands 2x10 each     Patient Education and Home Exercises       Education provided:   - continuing HEP  -VC and TC for proper form for exercises    Written Home Exercises Provided: YES. Exercises were reviewed and Baudilio was able to demonstrate them prior to the end of the session.  Baudilio demonstrated good understanding of the education provided. See EMR under Patient Instructions for exercises provided during therapy sessions    Assessment     Pt was reassessed today, and he " showed signs of improvement with decreased pain, increased strength, and increased ROM. Pt also showed improvement with all functional movements. Pt was progressed with single leg calf raises on the shuttle which were tolerated with no increases in pain. Pt is not yet ready for single leg calf raises with full body weight. Pt will continue to load achilles tendon and progress with eccentric exercises.     Baudilio Is progressing well towards his goals.   Pt prognosis is Good.     Pt will continue to benefit from skilled outpatient physical therapy to address the deficits listed in the problem list box on initial evaluation, provide pt/family education and to maximize pt's level of independence in the home and community environment.     Pt's spiritual, cultural and educational needs considered and pt agreeable to plan of care and goals.     Anticipated barriers to physical therapy: work      GOALS:   Short Term Goals:  8 weeks  1.Report decreased R foot/ankle pain  < / =  2/10  to increase tolerance for walking  2. Increase ROM by 10 degrees in order to R foot/ankle to increase tolerance for ADL and work activities.  4. Pt to tolerate HEP to improve ROM and independence with ADL's     Long Term Goals: 16 weeks  1.Report decreased R foot/ankle pain  < / =  0/10  to increase tolerance for working out   2.Patient goal: to return to walking and working out without pain  3.Increase strength to 4+/5 for R foot/ankle to increase tolerance for ADL and work activities.  4. Pt will report at CJ level (20-40% impaired) on Modified FIM score for mobility to demonstrate increase in LE function and mobility in home and community environment.       Plan   Plan of care Certification: 12/12/2024 to 04/03/2025.     Continue POC. Gradually load achilles tendon.     Isaías Ramos PT, DPT

## 2025-01-16 ENCOUNTER — CLINICAL SUPPORT (OUTPATIENT)
Dept: REHABILITATION | Facility: HOSPITAL | Age: 45
End: 2025-01-16
Payer: COMMERCIAL

## 2025-01-16 DIAGNOSIS — G89.29 HEEL PAIN, CHRONIC, RIGHT: ICD-10-CM

## 2025-01-16 DIAGNOSIS — M79.671 HEEL PAIN, CHRONIC, RIGHT: ICD-10-CM

## 2025-01-16 DIAGNOSIS — M25.671 DECREASED RANGE OF MOTION OF RIGHT ANKLE: Primary | ICD-10-CM

## 2025-01-16 PROCEDURE — 97110 THERAPEUTIC EXERCISES: CPT

## 2025-01-16 PROCEDURE — 97530 THERAPEUTIC ACTIVITIES: CPT

## 2025-01-16 PROCEDURE — 97140 MANUAL THERAPY 1/> REGIONS: CPT

## 2025-01-16 NOTE — PROGRESS NOTES
[facility]   Physical Therapy Treatment Note      Name: Baudilio Potts  Clinic Number: [id number]    Therapy Diagnosis:   Encounter Diagnoses   Name Primary?    Decreased range of motion of right ankle Yes    Heel pain, chronic, right        Physician: Erickson Lopez MD    Visit Date: 1/16/2025    Physician Orders: PT Eval and Treat   Medical Diagnosis from Referral: M76.61 (ICD-10-CM) - Right Achilles tendinitis    Evaluation Date: 12/12/2024  Authorization Period Expiration: 12/10/2025  Plan of Care Expiration: 04/03/2025  Progress Note Due: 2/14/2024  Visit # / Visits authorized: 3/10  FOTO: 2/3     Precautions: Standard,     PTA Visit #: 0/5     Time In: 1010  Time Out: 1100  Total Billable Time: 50 minutes    Subjective     Pt reports: He felt pretty good, but he had a little soreness yesterday afternoon; still no pain  He was compliant with home exercise program.  Response to previous treatment: soreness  Functional change: can walk further before pain begins     Pain: 2/10  Location: right ankles     Objective       Objective measures updated at progress report unless specified     Treatment     Baudilio received the treatments listed below:      therapeutic exercises to develop strength, endurance, ROM, flexibility, posture, and core stabilization for 8 minutes including:  Shuttle donkey kicks 2x10 2 band  Shuttle sidelying squats 3 bands 2x10 each     Not today:   Standing calf raises 2 up 1 down 2x10   ROM and strength assessments   Seated calf raises 10# 2x20  Knee extension machine 2x10 75#  Hamstring curl machine 2x10 85#    manual therapy techniques: Joint mobilizations, Manual traction, Myofacial release, Manual Lymphatic Drainage, Soft tissue Mobilization, and Friction Massage were applied to the: R ankle/foot for 8 minutes, including:  TC distraction manipulation> pt agreeable   AP TC glides gr III-IV  Subtalar eversion gr III-IV    neuromuscular re-education  "activities to improve: Balance, Coordination, Kinesthetic, Sense, Proprioception, and Posture for 0 minutes. The following activities were included:    Not today:   Ankle PF isometrics 45" holds, 2 minute breaks 6x  Towel scrunches 2 minutes   1/2 kneeling dorsiflexion with RTB 20x 5" holds     therapeutic activities to improve functional performance for 34 minutes, including:  Shuttle heel raises 2 up 1 down 4 bands 15x each   Box squats with arch lifts 20# 4x8  Standing calf raises 2 up 1 down 4x6 2" box  Single leg calf raises shuttle 3 bands 2x10    Sneaky lunges with plantar flexion 4x8  Elliptical 8' for return to walking     Not today:  Lateral band walks 5 laps GTB  Shuttle squats 4 bands 2x10   Shuttle heel raises 2 bands 2x10    Patient Education and Home Exercises       Education provided:   - continuing HEP  -VC and TC for proper form for exercises    Written Home Exercises Provided: YES. Exercises were reviewed and Baudilio was able to demonstrate them prior to the end of the session.  Baudilio demonstrated good understanding of the education provided. See EMR under Patient Instructions for exercises provided during therapy sessions    Assessment     Continues to improve tolerance to load on achilles. He was able to increase weight with most heel raises today and will keep progressing weight as tolerated. Pt needs to continue to progress single leg calf strength as well to help with walking tolerance. Progressed with elliptical today to help with return to walking further distances with less pain. Pt will continue to load achilles tendon and progress with eccentric exercises.     Baudilio Is progressing well towards his goals.   Pt prognosis is Good.     Pt will continue to benefit from skilled outpatient physical therapy to address the deficits listed in the problem list box on initial evaluation, provide pt/family education and to maximize pt's level of independence in the home and community environment. "     Pt's spiritual, cultural and educational needs considered and pt agreeable to plan of care and goals.     Anticipated barriers to physical therapy: work      GOALS:   Short Term Goals:  8 weeks  1.Report decreased R foot/ankle pain  < / =  2/10  to increase tolerance for walking  2. Increase ROM by 10 degrees in order to R foot/ankle to increase tolerance for ADL and work activities.  4. Pt to tolerate HEP to improve ROM and independence with ADL's     Long Term Goals: 16 weeks  1.Report decreased R foot/ankle pain  < / =  0/10  to increase tolerance for working out   2.Patient goal: to return to walking and working out without pain  3.Increase strength to 4+/5 for R foot/ankle to increase tolerance for ADL and work activities.  4. Pt will report at CJ level (20-40% impaired) on Modified FIM score for mobility to demonstrate increase in LE function and mobility in home and community environment.       Plan   Plan of care Certification: 12/12/2024 to 04/03/2025.     Continue POC. Gradually load achilles tendon.     Isaías Ramos PT, DPT

## 2025-01-28 ENCOUNTER — CLINICAL SUPPORT (OUTPATIENT)
Dept: REHABILITATION | Facility: HOSPITAL | Age: 45
End: 2025-01-28
Payer: COMMERCIAL

## 2025-01-28 DIAGNOSIS — M25.671 DECREASED RANGE OF MOTION OF RIGHT ANKLE: Primary | ICD-10-CM

## 2025-01-28 DIAGNOSIS — G89.29 HEEL PAIN, CHRONIC, RIGHT: ICD-10-CM

## 2025-01-28 DIAGNOSIS — M79.671 HEEL PAIN, CHRONIC, RIGHT: ICD-10-CM

## 2025-01-28 PROCEDURE — 97530 THERAPEUTIC ACTIVITIES: CPT

## 2025-01-28 NOTE — PROGRESS NOTES
[facility]   Physical Therapy Treatment Note      Name: Baudilio Potts  Clinic Number: [id number]    Therapy Diagnosis:   Encounter Diagnoses   Name Primary?    Decreased range of motion of right ankle Yes    Heel pain, chronic, right        Physician: Erickson Lopez MD    Visit Date: 1/28/2025    Physician Orders: PT Eval and Treat   Medical Diagnosis from Referral: M76.61 (ICD-10-CM) - Right Achilles tendinitis    Evaluation Date: 12/12/2024  Authorization Period Expiration: 12/10/2025  Plan of Care Expiration: 04/03/2025  Progress Note Due: 2/14/2024  Visit # / Visits authorized: 4/10  FOTO: 2/3     Precautions: Standard,     PTA Visit #: 0/5     Time In: 1102  Time Out: 1200  Total Billable Time: 58 minutes    Subjective     Pt reports: He has been feeling really good, and he did not have any pain last week; just a couple days of tenderness.   He was compliant with home exercise program.  Response to previous treatment: soreness  Functional change: can walk further before pain begins     Pain: 2/10  Location: right ankles     Objective       Objective measures updated at progress report unless specified     Treatment     Baudilio received the treatments listed below:      therapeutic exercises to develop strength, endurance, ROM, flexibility, posture, and core stabilization for 0 minutes including:      Not today:   Shuttle sidelying squats 3 bands 2x10 each   Standing calf raises 2 up 1 down 2x10   ROM and strength assessments   Seated calf raises 10# 2x20  Knee extension machine 2x10 75#  Hamstring curl machine 2x10 85#    manual therapy techniques: Joint mobilizations, Manual traction, Myofacial release, Manual Lymphatic Drainage, Soft tissue Mobilization, and Friction Massage were applied to the: R ankle/foot for 2 minutes, including:  TC distraction manipulation> pt agreeable   AP TC glides gr III-IV  Subtalar eversion gr III-IV    neuromuscular re-education activities to  "improve: Balance, Coordination, Kinesthetic, Sense, Proprioception, and Posture for 0 minutes. The following activities were included:    Not today:   Ankle PF isometrics 45" holds, 2 minute breaks 6x  Towel scrunches 2 minutes   1/2 kneeling dorsiflexion with RTB 20x 5" holds     therapeutic activities to improve functional performance for 56 minutes, including:  Box squats with arch lifts 20# 4x8  Standing calf raises 2 up 1 down 4x8 2" box 10# dumbbells each hand   Single leg calf raises 30x    Sneaky lunges with plantar flexion 4x8 10# dumbbells each hand   Elliptical 10' for return to walking   Shuttle donkey kicks 2x10 2 band    Not today:  Shuttle heel raises 2 up 1 down 4 bands 15x each   Lateral band walks 5 laps GTB  Shuttle squats 4 bands 2x10   Shuttle heel raises 2 bands 2x10    Patient Education and Home Exercises       Education provided:   - continuing HEP  -VC and TC for proper form for exercises    Written Home Exercises Provided: YES. Exercises were reviewed and Baudilio was able to demonstrate them prior to the end of the session.  Baudilio demonstrated good understanding of the education provided. See EMR under Patient Instructions for exercises provided during therapy sessions    Assessment     Participated in heavy eccentric exercises and increased single leg calf raises to body weight today. PT tolerated exercises well with no increases in pain, but he did report fatigue at the end of the session. Pt will continue to progressively load the achilles tendon and was educated on watching for symptoms of overload.     Baudilio Is progressing well towards his goals.   Pt prognosis is Good.     Pt will continue to benefit from skilled outpatient physical therapy to address the deficits listed in the problem list box on initial evaluation, provide pt/family education and to maximize pt's level of independence in the home and community environment.     Pt's spiritual, cultural and educational needs " considered and pt agreeable to plan of care and goals.     Anticipated barriers to physical therapy: work      GOALS:   Short Term Goals:  8 weeks  1.Report decreased R foot/ankle pain  < / =  2/10  to increase tolerance for walking  2. Increase ROM by 10 degrees in order to R foot/ankle to increase tolerance for ADL and work activities.  4. Pt to tolerate HEP to improve ROM and independence with ADL's     Long Term Goals: 16 weeks  1.Report decreased R foot/ankle pain  < / =  0/10  to increase tolerance for working out   2.Patient goal: to return to walking and working out without pain  3.Increase strength to 4+/5 for R foot/ankle to increase tolerance for ADL and work activities.  4. Pt will report at CJ level (20-40% impaired) on Modified FIM score for mobility to demonstrate increase in LE function and mobility in home and community environment.       Plan   Plan of care Certification: 12/12/2024 to 04/03/2025.     Continue POC. Gradually load achilles tendon.     Isaías Peaceulthoyana PT, DPT

## 2025-01-30 ENCOUNTER — CLINICAL SUPPORT (OUTPATIENT)
Dept: REHABILITATION | Facility: HOSPITAL | Age: 45
End: 2025-01-30
Payer: COMMERCIAL

## 2025-01-30 DIAGNOSIS — M79.671 HEEL PAIN, CHRONIC, RIGHT: ICD-10-CM

## 2025-01-30 DIAGNOSIS — G89.29 HEEL PAIN, CHRONIC, RIGHT: ICD-10-CM

## 2025-01-30 DIAGNOSIS — M25.671 DECREASED RANGE OF MOTION OF RIGHT ANKLE: Primary | ICD-10-CM

## 2025-01-30 PROCEDURE — 97140 MANUAL THERAPY 1/> REGIONS: CPT

## 2025-01-30 PROCEDURE — 97110 THERAPEUTIC EXERCISES: CPT

## 2025-01-30 PROCEDURE — 97530 THERAPEUTIC ACTIVITIES: CPT

## 2025-01-30 NOTE — PROGRESS NOTES
[facility]   Physical Therapy Treatment Note      Name: Baudilio Potts  Clinic Number: [id number]    Therapy Diagnosis:   Encounter Diagnoses   Name Primary?    Decreased range of motion of right ankle Yes    Heel pain, chronic, right      Physician: Erickson Lopez MD    Visit Date: 1/30/2025    Physician Orders: PT Eval and Treat   Medical Diagnosis from Referral: M76.61 (ICD-10-CM) - Right Achilles tendinitis    Evaluation Date: 12/12/2024  Authorization Period Expiration: 12/10/2025  Plan of Care Expiration: 04/03/2025  Progress Note Due: 2/14/2024  Visit # / Visits authorized: 5/10  FOTO: 2/3     Precautions: Standard,     PTA Visit #: 0/5     Time In: 1056  Time Out: 1158  Total Billable Time: 62 minutes    Subjective     Pt reports: he felt good after last session; he just had normal soreness. Not having pain like he was before starting therapy  He was compliant with home exercise program.    Response to previous treatment: soreness  Functional change: can walk further before pain begins     Pain: 2/10  Location: right ankles     Objective       Objective measures updated at progress report unless specified     Treatment     Baudilio received the treatments listed below:      therapeutic exercises to develop strength, endurance, ROM, flexibility, posture, and core stabilization for 10 minutes including:  Shuttle sidelying squats 3 bands 2x10 each   Single leg calf raises 30x      Not today:   Standing calf raises 2 up 1 down 2x10   ROM and strength assessments   Seated calf raises 10# 2x20  Knee extension machine 2x10 75#  Hamstring curl machine 2x10 85#    manual therapy techniques: Joint mobilizations, Manual traction, Myofacial release, Manual Lymphatic Drainage, Soft tissue Mobilization, and Friction Massage were applied to the: R ankle/foot for 2 minutes, including:  ROM and joint mobility assessment     Not today:  TC distraction manipulation> pt agreeable   AP TC glides  "gr III-IV  Subtalar eversion gr III-IV    neuromuscular re-education activities to improve: Balance, Coordination, Kinesthetic, Sense, Proprioception, and Posture for 0 minutes. The following activities were included:    Not today:   Ankle PF isometrics 45" holds, 2 minute breaks 6x  Towel scrunches 2 minutes   1/2 kneeling dorsiflexion with RTB 20x 5" holds     therapeutic activities to improve functional performance for 50 minutes, including:  Box squats with arch lifts 20# 4x8  Standing calf raises 2 up 1 down 4x8 2" box 10# dumbbells each hand   Sneaky lunges with plantar flexion 4x8 10# dumbbells each hand   Elliptical 10' for return to walking   Sled pushes 5 laps     Not today:  Shuttle donkey kicks 2x10 2 band  Shuttle heel raises 2 up 1 down 4 bands 15x each   Lateral band walks 5 laps GTB  Shuttle squats 4 bands 2x10   Shuttle heel raises 2 bands 2x10    Patient Education and Home Exercises       Education provided:   - continuing HEP  -VC and TC for proper form for exercises    Written Home Exercises Provided: YES. Exercises were reviewed and Baudilio was able to demonstrate them prior to the end of the session.  Baudilio demonstrated good understanding of the education provided. See EMR under Patient Instructions for exercises provided during therapy sessions    Assessment     Continued with heavy eccentric training today which patient continues to tolerate well. Progressed with sled pushes for more functional movement patterns. Tolerated exercises well with no increases in pain and noted fatigue. Pt will continue to progressively load the achilles tendon and was educated on watching for symptoms of overload.     Baudilio Is progressing well towards his goals.   Pt prognosis is Good.     Pt will continue to benefit from skilled outpatient physical therapy to address the deficits listed in the problem list box on initial evaluation, provide pt/family education and to maximize pt's level of independence in " the home and community environment.     Pt's spiritual, cultural and educational needs considered and pt agreeable to plan of care and goals.     Anticipated barriers to physical therapy: work      GOALS:   Short Term Goals:  8 weeks  1.Report decreased R foot/ankle pain  < / =  2/10  to increase tolerance for walking  2. Increase ROM by 10 degrees in order to R foot/ankle to increase tolerance for ADL and work activities.  4. Pt to tolerate HEP to improve ROM and independence with ADL's     Long Term Goals: 16 weeks  1.Report decreased R foot/ankle pain  < / =  0/10  to increase tolerance for working out   2.Patient goal: to return to walking and working out without pain  3.Increase strength to 4+/5 for R foot/ankle to increase tolerance for ADL and work activities.  4. Pt will report at CJ level (20-40% impaired) on Modified FIM score for mobility to demonstrate increase in LE function and mobility in home and community environment.       Plan   Plan of care Certification: 12/12/2024 to 04/03/2025.     Continue POC. Gradually load achilles tendon.     Isaías Ramos PT, DPT

## 2025-03-31 ENCOUNTER — PATIENT MESSAGE (OUTPATIENT)
Dept: ADMINISTRATIVE | Facility: HOSPITAL | Age: 45
End: 2025-03-31
Payer: COMMERCIAL

## 2025-06-09 ENCOUNTER — PATIENT MESSAGE (OUTPATIENT)
Dept: ADMINISTRATIVE | Facility: HOSPITAL | Age: 45
End: 2025-06-09
Payer: COMMERCIAL

## 2025-08-25 ENCOUNTER — PATIENT OUTREACH (OUTPATIENT)
Dept: ADMINISTRATIVE | Facility: HOSPITAL | Age: 45
End: 2025-08-25
Payer: COMMERCIAL